# Patient Record
Sex: FEMALE | Race: WHITE
[De-identification: names, ages, dates, MRNs, and addresses within clinical notes are randomized per-mention and may not be internally consistent; named-entity substitution may affect disease eponyms.]

---

## 2017-05-12 ENCOUNTER — HOSPITAL ENCOUNTER (OUTPATIENT)
Dept: HOSPITAL 63 - MAMMO | Age: 72
Discharge: HOME | End: 2017-05-12
Attending: PHYSICIAN ASSISTANT
Payer: MEDICARE

## 2017-05-12 DIAGNOSIS — Z12.31: Primary | ICD-10-CM

## 2017-05-12 PROCEDURE — 77063 BREAST TOMOSYNTHESIS BI: CPT

## 2017-05-12 NOTE — RAD
DATE: 5/12/2017



EXAM: MAMMO YUNIEL SCREENING BILATERAL



HISTORY: Screening. Note is made of the positive family history for breast

malignancy.



COMPARISON: 4/8/2016



This study was interpreted with the benefit of Computerized Aided Detection

(CAD).



FINDINGS:



Breast Density: SCATTERED  The breast parenchyma shows scattered

fibroglandular densities. Breast parenchyma level B. 



 There has not been a significant change in the appearance of the breasts

compared to the previous exam  







IMPRESSION: Benign finding







BI-RADS CATEGORY: 2 BENIGN FINDING(S)



RECOMMENDED FOLLOW-UP: 12M 12 MONTH FOLLOW-UP



PQRS compliance statement: Patient information was entered into a reminder

system with a target due date 5/12/2018 for the next mammogram.



Mammography is a sensitive method for finding small breast cancers, but it

does not detect them all and is not a substitute for careful clinical

examination.  A negative mammogram does not negate a clinically suspicious

finding and should not result in delay in biopsying a clinically suspicious

abnormality.



"Our facility is accredited by the [employer] Mammography

Program."

## 2017-11-01 ENCOUNTER — HOSPITAL ENCOUNTER (OUTPATIENT)
Dept: HOSPITAL 63 - MAMMO | Age: 72
Discharge: HOME | End: 2017-11-01
Attending: PHYSICIAN ASSISTANT
Payer: MEDICARE

## 2017-11-01 DIAGNOSIS — N64.52: ICD-10-CM

## 2017-11-01 DIAGNOSIS — R92.1: ICD-10-CM

## 2017-11-01 DIAGNOSIS — R92.0: ICD-10-CM

## 2017-11-01 DIAGNOSIS — N63.20: Primary | ICD-10-CM

## 2017-11-01 NOTE — RAD
DATE: 11/1/2017   



EXAM: DIGITAL DIAGNOSTIC LT



HISTORY: Left nipple discharge   



COMPARISON: 5/12/2017, 4/8/2016   



This study was interpreted with the benefit of Computerized Aided Detection

(CAD).



The breast parenchyma shows scattered fibroglandular densities. Breast

parenchyma level B.





FINDINGS: 2-D and 3-D tomosynthesis imaging of the left breast was obtained in

the CC and MLO projections. There are scattered fibroglandular densities in

the breast in a nodular pattern. There are several discrete smooth nodules in

the left breast which appear unchanged. No new or enlarging breast densities

seen. There are multiple benign secretory type calcifications in the left

breast. There are several additional scattered clusters of microcalcifications

which are also unchanged.





IMPRESSION: Stable left breast nodules and microcalcifications as described

above. If a reproducible breast discharge is present, galactography may be

considered for further evaluation.





BI-RADS CATEGORY: 0 INCOMPLETE: NEEDS ADDITIONAL IMAGING EVALUATION AND/OR

PRIOR MAMMOGRAMS FOR COMPARISON.



RECOMMENDED FOLLOW-UP: ADD ADDITIONAL IMAGING



PQRS compliance statement: Patient information was entered into a reminder

system with a target due date     for the next mammogram.



Mammography is a sensitive method for finding small breast cancers, but it

does not detect them all and is not a substitute for careful clinical

examination.  A negative mammogram does not negate a clinically suspicious

finding and should not result in delay in biopsying a clinically suspicious

abnormality.



"Our facility is accredited by the [employer] Mammography

Program."

## 2017-12-12 ENCOUNTER — HOSPITAL ENCOUNTER (OUTPATIENT)
Dept: HOSPITAL 61 - MAMMO | Age: 72
Discharge: HOME | End: 2017-12-12
Payer: MEDICARE

## 2017-12-12 DIAGNOSIS — N64.52: ICD-10-CM

## 2017-12-12 DIAGNOSIS — N60.42: Primary | ICD-10-CM

## 2017-12-12 DIAGNOSIS — R92.1: ICD-10-CM

## 2017-12-12 PROCEDURE — 77053 X-RAY OF MAMMARY DUCT: CPT

## 2017-12-12 PROCEDURE — 19030 NJX PX ONLY MAM DUCTO/GLCTO: CPT

## 2017-12-12 NOTE — RAD
Left breast ductogram, 12/12/2017:



History: Left breast discharge



Following cleansing of the left nipple, a single discharging duct was

identified near the center of the nipple. It was successfully cannulated with

a 27-gauge blunt-tipped cannula. Iodinated contrast material was injected and

appropriate digital mammograms obtained.



The preliminary CC image again demonstrates numerous secretory type

calcifications in the left breast. There are additional clustered

microcalcifications which have been shown to be stable.



The ductal system centered at the 3-4 o'clock location was partially

opacified. Some of the injected contrast preferentially spilled back out of

the duct onto the surface of the patient. There is considerable ductal

dilatation just deep to the nipple. The ductal branches demonstrate multiple

stenotic segments intermixed with dilated segments. On several of the cc and

ML images there is a suggestion of a small filling defect within an

incompletely opacified, distended duct at the 3:00 location. No other filling

defect is seen in the visualized ducts.





IMPRESSION:

1. Ductal ectasia.

2. Incomplete opacification of the ducts due to technical factors.

3. Possible small filling defect in an incompletely opacified duct at the 3:00

location. Repeat ductography followed by a targeted ultrasound exam may be

useful for further evaluation. Alternatively, surgical exploration of the duct

following methylene blue dye instillation into the duct could be considered.

Surgical consultation is suggested.

## 2018-01-22 ENCOUNTER — HOSPITAL ENCOUNTER (OUTPATIENT)
Dept: HOSPITAL 61 - US | Age: 73
Discharge: HOME | End: 2018-01-22
Attending: SURGERY
Payer: MEDICARE

## 2018-01-22 DIAGNOSIS — N64.52: ICD-10-CM

## 2018-01-22 DIAGNOSIS — N63.20: Primary | ICD-10-CM

## 2018-01-22 PROCEDURE — 76641 ULTRASOUND BREAST COMPLETE: CPT

## 2018-02-09 ENCOUNTER — HOSPITAL ENCOUNTER (OUTPATIENT)
Dept: HOSPITAL 61 - SURG | Age: 73
Discharge: HOME | End: 2018-02-09
Attending: SURGERY
Payer: MEDICARE

## 2018-02-09 VITALS
SYSTOLIC BLOOD PRESSURE: 139 MMHG | DIASTOLIC BLOOD PRESSURE: 82 MMHG | SYSTOLIC BLOOD PRESSURE: 139 MMHG | DIASTOLIC BLOOD PRESSURE: 82 MMHG

## 2018-02-09 DIAGNOSIS — Z86.39: ICD-10-CM

## 2018-02-09 DIAGNOSIS — I10: ICD-10-CM

## 2018-02-09 DIAGNOSIS — D64.9: ICD-10-CM

## 2018-02-09 DIAGNOSIS — Z88.2: ICD-10-CM

## 2018-02-09 DIAGNOSIS — E11.9: ICD-10-CM

## 2018-02-09 DIAGNOSIS — Z87.39: ICD-10-CM

## 2018-02-09 DIAGNOSIS — Z98.42: ICD-10-CM

## 2018-02-09 DIAGNOSIS — N64.52: Primary | ICD-10-CM

## 2018-02-09 DIAGNOSIS — Z90.49: ICD-10-CM

## 2018-02-09 DIAGNOSIS — Z72.89: ICD-10-CM

## 2018-02-09 DIAGNOSIS — Z98.41: ICD-10-CM

## 2018-02-09 DIAGNOSIS — Z79.899: ICD-10-CM

## 2018-02-09 DIAGNOSIS — Z88.1: ICD-10-CM

## 2018-02-09 DIAGNOSIS — N63.0: ICD-10-CM

## 2018-02-09 LAB — POC GLUCOSE: 136 MG/DL (ref 70–99)

## 2018-02-09 PROCEDURE — 19083 BX BREAST 1ST LESION US IMAG: CPT

## 2018-02-09 PROCEDURE — 88305 TISSUE EXAM BY PATHOLOGIST: CPT

## 2018-02-09 PROCEDURE — 76942 ECHO GUIDE FOR BIOPSY: CPT

## 2018-02-09 PROCEDURE — 82962 GLUCOSE BLOOD TEST: CPT

## 2018-02-09 PROCEDURE — 76098 X-RAY EXAM SURGICAL SPECIMEN: CPT

## 2018-02-09 RX ADMIN — SODIUM CHLORIDE, SODIUM LACTATE, POTASSIUM CHLORIDE, AND CALCIUM CHLORIDE 1 MLS/HR: .6; .31; .03; .02 INJECTION, SOLUTION INTRAVENOUS at 07:00

## 2018-02-09 RX ADMIN — HYDROCODONE BITARTRATE AND ACETAMINOPHEN 1 TAB: 5; 325 TABLET ORAL at 12:41

## 2018-02-09 RX ADMIN — BUPIVACAINE HYDROCHLORIDE AND EPINEPHRINE BITARTRATE 1 ML: 5; .005 INJECTION, SOLUTION EPIDURAL; INTRACAUDAL; PERINEURAL at 11:08

## 2018-02-09 RX ADMIN — METHYLENE BLUE 1 ML: 10 INJECTION INTRAVENOUS at 11:08

## 2018-02-09 RX ADMIN — BACITRACIN 1 MLS/HR: 5000 INJECTION, POWDER, FOR SOLUTION INTRAMUSCULAR at 11:00

## 2018-07-18 ENCOUNTER — HOSPITAL ENCOUNTER (OUTPATIENT)
Dept: HOSPITAL 63 - MAMMO | Age: 73
Discharge: HOME | End: 2018-07-18
Attending: PHYSICIAN ASSISTANT
Payer: MEDICARE

## 2018-07-18 DIAGNOSIS — Z12.31: Primary | ICD-10-CM

## 2018-07-18 PROCEDURE — 77063 BREAST TOMOSYNTHESIS BI: CPT

## 2018-07-18 PROCEDURE — 77067 SCR MAMMO BI INCL CAD: CPT

## 2018-07-18 NOTE — RAD
DATE: July 18, 2018



EXAM: MAMMO YUNIEL SCREENING BILATERAL



HISTORY: Routine screening.



COMPARISON: Priors back to April 8, 2016.



TECHNIQUE: 2D digital CC and MLO views were obtained.  3D tomosynthesis

imaging was performed in the CC and MLO projections.



This study was interpreted with the benefit of Computerized Aided Detection

(CAD).



FINDINGS:



The breast parenchyma demonstrates scattered fibroglandular densities,

category B.  There is no worrisome mass or area of architectural distortion. 

Secretory and other benign-appearing calcifications are noted bilaterally. 

These are stable.  There are postoperative findings of lumpectomy on the left,

at the 3:00 position extending retroareolar.





IMPRESSION: Benign findings.





BI-RADS CATEGORY: 2 BENIGN FINDING



RECOMMENDED FOLLOW-UP: 12M 12 MONTH FOLLOW-UP



PQRS compliance statement: Patient information was entered into a reminder

system with a target due date for the next mammogram.



Mammography is a sensitive method for finding small breast cancers, but it

does not detect them all and is not a substitute for careful clinical

examination.  A negative mammogram does not negate a clinically suspicious

finding and should not result in delay in biopsying a clinically suspicious

abnormality.



"Our facility is accredited by the [employer] Mammography

Program."

## 2019-08-30 ENCOUNTER — HOSPITAL ENCOUNTER (OUTPATIENT)
Dept: HOSPITAL 61 - NM | Age: 74
Discharge: HOME | End: 2019-08-30
Attending: INTERNAL MEDICINE
Payer: MEDICARE

## 2019-08-30 DIAGNOSIS — E11.9: ICD-10-CM

## 2019-08-30 DIAGNOSIS — Z87.891: ICD-10-CM

## 2019-08-30 DIAGNOSIS — I45.10: Primary | ICD-10-CM

## 2019-08-30 PROCEDURE — 78452 HT MUSCLE IMAGE SPECT MULT: CPT

## 2019-08-30 PROCEDURE — A9500 TC99M SESTAMIBI: HCPCS

## 2019-08-30 PROCEDURE — 93017 CV STRESS TEST TRACING ONLY: CPT

## 2019-08-30 NOTE — RAD
MR#: [id number]

Account#: [id number]

Accession#: [id number]

Date of Study: 08/30/2019

Ordering Physician: YVONNE BYRNE, 

Referring Physician: NIURKA ZAZUETA Tech: NEYMAR Bowman





--------------- APPROVED REPORT --------------





Test Type:          Exercise

Stress Nurse/Tech: MINGO Coppola

Test Indications: Chest pain

Cardiac History: Kidney diseae, x-smoker, DM

Medications:     See Electronic Medical Record

Medical History: See Electronic Medical Record

Resting ECG:     SR with BBB

Resting Heart Rate: 88 bpm

Resting Blood Pressure: 141/77mmHg

Pretest Chest Pain: None



Nurse/Tech Notes

Lungs CTA, S1S2

Consent: The procedure was explained to the patient in lay terms. Informed consent was witnessed. Sergey
eout was entered into [employer]Picker. History and Stress Test performed by mingo Coppola RN



Stress Symptoms

Dyspnea, no chest pain



POST EXERCISE

Reason for Termination: Reached target heart rate

Target HR: 131

Max HR: 141 bpm

95% of Maximum Predicted HR: 149 bpm

Exercise duration: 4:14 min:sec, 2 Stage

Max Blood Pressure: 174/77mmHg

Blood Pressure response to exercise: Normal blood pressure response during stress.

Heart Rate response to exercise: normal response

Chest Pain: No. 

Arrhythmia: No. 

ST Change: No. 



INTERPRETATION

Stress EKG Conclusion: The resting EKG shows a sinus rhythm, nonspecific ST-T wave changes and an inc
omplete right bundle branch block.

The stress EKG shows no significant changes from baseline.

No EKG evidence of stress-induced ischemia.



Imaging Protocol

IMAGE PROTOCOL: Rest Tc-99m/stress Tc-99m 1 day



Rest:            Stress:         Viability:   

Radiopharm.Tc99m BddvkfaivKi88h Sestamibi

Zqio68fVn            33mCi            

Duration    15min.           10min.           

Img Date  08/30/2019 08/30/2019      

Inj-Img Nmev26rxd.           60min.           



Post-Injection Exercise:   1 minute

Rest Admin Site:IV - Left AntecubitalAdministrator:NEYMAR Bowman

Stress Admin Site: IV - Left AntecubitalAdministrator: LEO Gomez, ARRT (R)(N)



STRESS DATA

End Diast. Vol.58.0mlAv. Heart Ebsr488.0bpm

End Syst. Vol.12.0mlCO Index BSA0.0L/min

Myocardial Qfhb859.0gEject. Lhfpyxld58.0%



Stress Rates

Pk. Fill Rate4.93EDV/secLVtime Pk. Fill 137.76msec

Pk. Empty Rate5.93ESV/secLVtime Pk. Uqqqk937.92msec

1/3 Pk. Fill0.73EDV/sec



Stress Scores

Regional WT2.00Summed WT7.00

Regional WM0.00Summed WM0.00



LV Perfusion

The stress scans show no significant defects.

The rest scans show no significant defects.

Nuclear imaging shows no reversible ischemia or infarct.



Wall Motion

Ventricular systolic function is normal with no regional wall motion abnormalities and an ejection fr
action of greater than 70%.



LV Perf. Quant

17 Seg. SSS1.00

17 Seg. SRS2.00

17 Seg. SDS0.00

Stress Defect Extent (% LAD)0.00Rest Defect Extent (% LAD)0.00Rev. Defect Extent (% LAD)0.00

Stress Defect Extent (% LCX) 0.00Rest Defect Extent (% LCX)0.00Rev. Defect Extent (% LCX)0.00

Stress Defect Extent (% RCA)0.00Rest Defect Extent (% RCA)12.20Rev. Defect Extent (% RCA)0.00

Stress Defect Extent (% WILLOW)0.00Rest Defect Extent (% WILLOW)2.40Rev. Defect Extent (% WILLOW)0.00



Conclusion

1. Fair exercise tolerance.

2. No chest pain reported with exertion.

3. No EKG evidence of stressed induced ischemia.

4. Nuclear imaging shows no reversible ischemia or infarct.

5. Left ventricular systolic function is normal with an ejection fraction of greater than 70%.

6. Low risk Lexiscan nuclear stress test.



Signed by : Michael Spring MD

Electronically Approved : 08/30/2019 11:49:34

## 2019-10-01 ENCOUNTER — HOSPITAL ENCOUNTER (OUTPATIENT)
Dept: HOSPITAL 63 - ECHO | Age: 74
Discharge: HOME | End: 2019-10-01
Payer: MEDICARE

## 2019-10-01 DIAGNOSIS — R07.9: Primary | ICD-10-CM

## 2019-10-01 PROCEDURE — 93306 TTE W/DOPPLER COMPLETE: CPT

## 2019-10-01 NOTE — CARD
MR#: [id number]

Account#: [id number]

Accession#: [id number]

Date of Study: 10/01/2019

Ordering Physician: YVONNE BYRNE, 

Referring Physician: YVONNE BYRNE, 

Tech: Isi Ledezma TED





--------------- APPROVED REPORT --------------





EXAM: Two-dimensional and M-mode echocardiogram with Doppler and color Doppler.



Other Information 

Quality : AverageHR: 90bpm

Rhythm : NSR



INDICATION

Chest Pain 



2D DIMENSIONS 

RVDd3.0 (2.9-3.5cm)Left Atrium(2D)3.2 (1.6-4.0cm)

IVSd1.1 (0.7-1.1cm)Aortic Root(2D)2.8 (2.0-3.7cm)

LVDd4.8 (3.9-5.9cm)LVOT Diameter1.9 (1.8-2.4cm)

PWd0.9 (0.7-1.1cm)LVDs3.4 (2.5-4.0cm)

FS (%) 27.7 %SV56.5 ml

LVEF(%)53.7 (>50%)



M-Mode DIMENSIONS 

Left Atrium(MM)3.37 (2.5-4.0cm)Aortic Root3.23 (2.2-3.7cm)



Aortic Valve

AoV Peak Gab.143.2cm/Cipriano Peak GR.8.2mmHg

LVOT Peak Gab.103.4cm/sLVOT  VTI 25.24cm

SORAYA (VMAX)2.58ka8EQX   (VTI)2.20cm2



Mitral Valve

MV E Qfluveyo46.0cm/sMV DECEL NTFV503of

MV A Sxdokcul826.0cm/sE/A  Ratio0.8



Pulmonary Valve

PV Peak Lonvybuf474.6cm/sPV Peak Grad.5mmHg



Tricuspid Valve

TR P. Zoyiycyz619qu/sRAP VWWQWQRG5bkVn

TR Peak Gr.40iwDfJTFH86bsOr



 LEFT VENTRICLE 

The left ventricle is normal size. There is normal left ventricular wall thickness. The left ventricu
lar systolic function is normal. The Ejection Fraction is 55-60%. There is normal LV segmental wall m
otion. Transmitral Doppler flow pattern is Grade I-abnormal relaxation pattern.



 RIGHT VENTRICLE 

The right ventricle is normal size. There is normal right ventricular wall thickness. The right ventr
icular systolic function is normal.



 ATRIA 

The left atrium size is normal. The right atrium size is normal. The interatrial septum is intact wit
h no evidence for an atrial septal defect or patent foramen ovale as noted on 2-D or Doppler imaging.




 AORTIC VALVE 

The aortic valve is normal in structure and function. The aortic valve is trileaflet. Doppler and Col
or Flow revealed trace aortic regurgitation. There is no significant aortic valvular stenosis. There 
is no aortic valvular vegetation.



 MITRAL VALVE 

The mitral valve is normal in structure and function. There is no evidence of mitral valve prolapse. 
There is no mitral valve stenosis. Doppler and Color-flow revealed trace mitral regurgitation.



 TRICUSPID VALVE 

The tricuspid valve is normal in structure and function. Doppler and Color Flow revealed trace tricus
pid regurgitation. The PA pressure was estimated at 28 mmHg. There is no tricuspid valve prolapse or 
vegetation. There is no tricuspid valve stenosis.



 PULMONIC VALVE 

The pulmonic valve is not well visualized.



 GREAT VESSELS 

The aortic root is normal in size. The ascending aorta is normal in size. The IVC is normal in size a
nd collapses >50% with inspiration.



 PERICARDIAL EFFUSION 

There is no evidence of significant pericardial effusion.



Critical Notification

Critical Value: No



<Conclusion>

The left ventricular systolic function is normal.

The Ejection Fraction is 55-60%.

There is normal LV segmental wall motion.

Transmitral Doppler flow pattern is Grade I-abnormal relaxation pattern.

Trace mitral regurgitation.

Trace tricuspid regurgitation.

The PA pressure was estimated at 28 mmHg.

There is no evidence of significant pericardial effusion.



Signed by : Mk Wray, 

Electronically Approved : 10/01/2019 09:23:13

## 2019-10-02 ENCOUNTER — HOSPITAL ENCOUNTER (OUTPATIENT)
Dept: HOSPITAL 63 - MAMMO | Age: 74
Discharge: HOME | End: 2019-10-02
Attending: PHYSICIAN ASSISTANT
Payer: MEDICARE

## 2019-10-02 DIAGNOSIS — N63.11: Primary | ICD-10-CM

## 2019-10-02 PROCEDURE — 77067 SCR MAMMO BI INCL CAD: CPT

## 2019-10-02 PROCEDURE — 77063 BREAST TOMOSYNTHESIS BI: CPT

## 2019-10-04 NOTE — RAD
DATE: 10 2/2/2019



EXAM: MAMMO YUNIEL SCREENING BILATERAL



HISTORY: Routine screening. Benign left breast biopsy in 2018.



COMPARISON: 7/18/2018, 5/12/2017, 4/8/2016 mammographic exams



This study was interpreted with the benefit of Computerized Aided Detection

(CAD).





Breast Density: HETERO The breast parenchyma is heterogenously dense, which

could reduce sensitivity of mammography. Breast parenchyma level C.





FINDINGS: Distortion involving the left breast is stable correspond to

previous biopsy. No suspicious calcifications. No suspicious distortion. Small

mass at the right upper outer breast posteriorly approximately 9 cm from the

nipple is present. This may represent a lymph node although it is not

definitely seen on prior exams. It measures 0.8 cm diameter. Small asymmetry

is present lateral to this on the CC view. This asymmetry may present on prior

exams.



IMPRESSION: Right posterior upper outer breast mass which likely represents a

lymph node. Asymmetry lateral to this finding on the CC projection noted.







BI-RADS CATEGORY: 0 INCOMPLETE: NEEDS ADDITIONAL IMAGING EVALUATION AND/OR

PRIOR MAMMOGRAMS FOR COMPARISON.



 FOLLOW-UP: ADD ADDITIONAL IMAGING. Spot compression imaging of the right

upper-outer breast mass in the CC and MLO projections is recommended.

Ultrasound of the right upper-outer breast may be needed.



PQRS compliance statement: Patient information was entered into a reminder

system with a target due date pending further imaging for the next mammogram.



Mammography is a sensitive method for finding small breast cancers, but it

does not detect them all and is not a substitute for careful clinical

examination.  A negative mammogram does not negate a clinically suspicious

finding and should not result in delay in biopsying a clinically suspicious

abnormality.



"Our facility is accredited by the [employer] Mammography

Program."

## 2019-10-22 ENCOUNTER — HOSPITAL ENCOUNTER (OUTPATIENT)
Dept: HOSPITAL 63 - MAMMO | Age: 74
Discharge: HOME | End: 2019-10-22
Attending: PHYSICIAN ASSISTANT
Payer: MEDICARE

## 2019-10-22 DIAGNOSIS — R92.2: Primary | ICD-10-CM

## 2019-10-22 PROCEDURE — 76641 ULTRASOUND BREAST COMPLETE: CPT

## 2019-10-22 PROCEDURE — 77065 DX MAMMO INCL CAD UNI: CPT

## 2019-10-22 NOTE — RAD
DATE: 10/22/2019



EXAM: DIGITAL DIAGNOSTIC RT, BREAST RIGHT



HISTORY: Abnormal mammogram



COMPARISON: 4/8/2016, 5/12/2017, 7/18/2018 mammographic exams



This study was interpreted with the benefit of Computerized Aided Detection

(CAD).





Breast Density: SCATTERED The breast parenchyma shows scattered fibroglandular

densities. Breast parenchyma level B.





FINDINGS: Persistence of the asymmetry at the outer aspect of the right breast

is noted on spot compression imaging in the CC projection. A small lucency is

noted within this structure which may represent a hilum of a lymph node. This

asymmetry may have a finding on spot compression imaging in the upper right

breast on the MLO view. These findings are smoothly marginated.



A smaller asymmetry is identified persist but appears to be present on

7/18/2018.



Limited ultrasound examination of the right upper-outer breast does not

demonstrate a mass or nodule.





IMPRESSION: Right upper outer breast asymmetry which may represent a lymph

node.







BI-RADS CATEGORY: 3 PROBABLY BENIGN FINDING(S)-SHORT INTERVAL FOLLOW-UP

SUGGESTED



RECOMMENDED FOLLOW-UP: 6M 6 MONTH FOLLOW-UP. Six-month follow-up mammographic

examination of the right breast is recommended to assess stability. Ultrasound

may be needed at that time.



PQRS compliance statement: Patient information was entered into a reminder

system with a target due date for the next mammogram.



Mammography is a sensitive method for finding small breast cancers, but it

does not detect them all and is not a substitute for careful clinical

examination.  A negative mammogram does not negate a clinically suspicious

finding and should not result in delay in biopsying a clinically suspicious

abnormality.



"Our facility is accredited by the [employer] Mammography

Program."

## 2020-04-22 ENCOUNTER — HOSPITAL ENCOUNTER (INPATIENT)
Dept: HOSPITAL 63 - 1 SOUTH | Age: 75
LOS: 10 days | Discharge: NURSING, REHAB OR LONG-TERM CARE | DRG: 371 | End: 2020-05-02
Attending: INTERNAL MEDICINE | Admitting: INTERNAL MEDICINE
Payer: MEDICARE

## 2020-04-22 VITALS — DIASTOLIC BLOOD PRESSURE: 61 MMHG | SYSTOLIC BLOOD PRESSURE: 94 MMHG

## 2020-04-22 VITALS — BODY MASS INDEX: 32.99 KG/M2 | WEIGHT: 205.25 LBS | HEIGHT: 66 IN

## 2020-04-22 VITALS — SYSTOLIC BLOOD PRESSURE: 102 MMHG | DIASTOLIC BLOOD PRESSURE: 61 MMHG

## 2020-04-22 VITALS — DIASTOLIC BLOOD PRESSURE: 65 MMHG | SYSTOLIC BLOOD PRESSURE: 100 MMHG

## 2020-04-22 DIAGNOSIS — Z98.42: ICD-10-CM

## 2020-04-22 DIAGNOSIS — D50.0: ICD-10-CM

## 2020-04-22 DIAGNOSIS — E87.70: ICD-10-CM

## 2020-04-22 DIAGNOSIS — Z79.4: ICD-10-CM

## 2020-04-22 DIAGNOSIS — E78.5: ICD-10-CM

## 2020-04-22 DIAGNOSIS — I12.9: ICD-10-CM

## 2020-04-22 DIAGNOSIS — Z20.828: ICD-10-CM

## 2020-04-22 DIAGNOSIS — E87.1: ICD-10-CM

## 2020-04-22 DIAGNOSIS — E87.2: ICD-10-CM

## 2020-04-22 DIAGNOSIS — E43: ICD-10-CM

## 2020-04-22 DIAGNOSIS — Z87.891: ICD-10-CM

## 2020-04-22 DIAGNOSIS — Z98.41: ICD-10-CM

## 2020-04-22 DIAGNOSIS — A04.72: Primary | ICD-10-CM

## 2020-04-22 DIAGNOSIS — D64.9: ICD-10-CM

## 2020-04-22 DIAGNOSIS — E11.43: ICD-10-CM

## 2020-04-22 DIAGNOSIS — E86.0: ICD-10-CM

## 2020-04-22 DIAGNOSIS — Z80.1: ICD-10-CM

## 2020-04-22 DIAGNOSIS — N18.9: ICD-10-CM

## 2020-04-22 DIAGNOSIS — N17.9: ICD-10-CM

## 2020-04-22 DIAGNOSIS — Z82.49: ICD-10-CM

## 2020-04-22 DIAGNOSIS — Z80.3: ICD-10-CM

## 2020-04-22 DIAGNOSIS — Z90.710: ICD-10-CM

## 2020-04-22 DIAGNOSIS — E11.22: ICD-10-CM

## 2020-04-22 DIAGNOSIS — E66.9: ICD-10-CM

## 2020-04-22 LAB
% BANDS: 5 % (ref 0–9)
% LYMPHS: 5 % (ref 24–48)
% MONOS: 3 % (ref 0–10)
% SEGS: 87 % (ref 35–66)
ALBUMIN SERPL-MCNC: 2.3 G/DL (ref 3.4–5)
ALBUMIN/GLOB SERPL: 0.6 {RATIO} (ref 1–1.7)
ALP SERPL-CCNC: 106 U/L (ref 46–116)
ALT SERPL-CCNC: 23 U/L (ref 14–59)
ANION GAP SERPL CALC-SCNC: 13 MMOL/L (ref 6–14)
AST SERPL-CCNC: 47 U/L (ref 15–37)
BASOPHILS # BLD AUTO: 0.1 X10^3/UL (ref 0–0.2)
BASOPHILS NFR BLD: 0 % (ref 0–3)
BILIRUB SERPL-MCNC: 1 MG/DL (ref 0.2–1)
BUN/CREAT SERPL: 16 (ref 6–20)
CA-I SERPL ISE-MCNC: 60 MG/DL (ref 7–20)
CALCIUM SERPL-MCNC: 8.9 MG/DL (ref 8.5–10.1)
CHLORIDE SERPL-SCNC: 93 MMOL/L (ref 98–107)
CO2 SERPL-SCNC: 21 MMOL/L (ref 21–32)
CREAT SERPL-MCNC: 3.8 MG/DL (ref 0.6–1)
EOSINOPHIL NFR BLD: 0.2 X10^3/UL (ref 0–0.7)
EOSINOPHIL NFR BLD: 1 % (ref 0–3)
ERYTHROCYTE [DISTWIDTH] IN BLOOD BY AUTOMATED COUNT: 16.8 % (ref 11.5–14.5)
GFR SERPLBLD BASED ON 1.73 SQ M-ARVRAT: 11.7 ML/MIN
GLOBULIN SER-MCNC: 4 G/DL (ref 2.2–3.8)
GLUCOSE SERPL-MCNC: 205 MG/DL (ref 70–99)
HCT VFR BLD CALC: 32.7 % (ref 36–47)
HGB BLD-MCNC: 10.6 G/DL (ref 12–15.5)
LYMPHOCYTES # BLD: 1.4 X10^3/UL (ref 1–4.8)
LYMPHOCYTES NFR BLD AUTO: 4 % (ref 24–48)
MCH RBC QN AUTO: 31 PG (ref 25–35)
MCHC RBC AUTO-ENTMCNC: 33 G/DL (ref 31–37)
MCV RBC AUTO: 95 FL (ref 79–100)
MONO #: 0.9 X10^3/UL (ref 0–1.1)
MONOCYTES NFR BLD: 3 % (ref 0–9)
NEUT #: 31 X10^3UL (ref 1.8–7.7)
NEUTROPHILS NFR BLD AUTO: 93 % (ref 31–73)
PLATELET # BLD AUTO: 228 X10^3/UL (ref 140–400)
PLATELET # BLD EST: ADEQUATE 10*3/UL
POTASSIUM SERPL-SCNC: 4.7 MMOL/L (ref 3.5–5.1)
PROT SERPL-MCNC: 6.3 G/DL (ref 6.4–8.2)
RBC # BLD AUTO: 3.45 X10^6/UL (ref 3.5–5.4)
SODIUM SERPL-SCNC: 127 MMOL/L (ref 136–145)
WBC # BLD AUTO: 33.5 X10^3/UL (ref 4–11)

## 2020-04-22 PROCEDURE — 83735 ASSAY OF MAGNESIUM: CPT

## 2020-04-22 PROCEDURE — 82947 ASSAY GLUCOSE BLOOD QUANT: CPT

## 2020-04-22 PROCEDURE — 71045 X-RAY EXAM CHEST 1 VIEW: CPT

## 2020-04-22 PROCEDURE — 80048 BASIC METABOLIC PNL TOTAL CA: CPT

## 2020-04-22 PROCEDURE — 36415 COLL VENOUS BLD VENIPUNCTURE: CPT

## 2020-04-22 PROCEDURE — 83605 ASSAY OF LACTIC ACID: CPT

## 2020-04-22 PROCEDURE — 87040 BLOOD CULTURE FOR BACTERIA: CPT

## 2020-04-22 PROCEDURE — 71250 CT THORAX DX C-: CPT

## 2020-04-22 PROCEDURE — 85025 COMPLETE CBC W/AUTO DIFF WBC: CPT

## 2020-04-22 PROCEDURE — 85027 COMPLETE CBC AUTOMATED: CPT

## 2020-04-22 PROCEDURE — 81001 URINALYSIS AUTO W/SCOPE: CPT

## 2020-04-22 PROCEDURE — 80053 COMPREHEN METABOLIC PANEL: CPT

## 2020-04-22 PROCEDURE — 85007 BL SMEAR W/DIFF WBC COUNT: CPT

## 2020-04-22 PROCEDURE — 74176 CT ABD & PELVIS W/O CONTRAST: CPT

## 2020-04-22 RX ADMIN — SODIUM CHLORIDE SCH MLS/HR: 0.9 INJECTION, SOLUTION INTRAVENOUS at 15:27

## 2020-04-22 RX ADMIN — GABAPENTIN SCH MG: 100 CAPSULE ORAL at 20:51

## 2020-04-22 RX ADMIN — VANCOMYCIN HYDROCHLORIDE SCH MG: 500 INJECTION, POWDER, LYOPHILIZED, FOR SOLUTION INTRAVENOUS at 20:51

## 2020-04-22 RX ADMIN — INSULIN LISPRO SCH UNITS: 100 INJECTION, SOLUTION INTRAVENOUS; SUBCUTANEOUS at 16:56

## 2020-04-22 RX ADMIN — VANCOMYCIN HYDROCHLORIDE SCH MG: 500 INJECTION, POWDER, LYOPHILIZED, FOR SOLUTION INTRAVENOUS at 17:20

## 2020-04-22 NOTE — RAD
Examination: CT of the chest abdomen pelvis without contrast

 

HISTORY: History of cough, intractable diarrhea

 

COMPARISON: None available 

 

Technique: Axial CT images of the chest abdomen pelvis were performed 

without IV contrast. Coronal and sagittal images are performed

 

Exposure: One or more of the following individualized dose reduction 

techniques were utilized for this examination:  1. Automated exposure 

control  2. Adjustment of the mA and/or kV according to patient size  3. 

Use of iterative reconstruction technique

 

FINDINGS:

 

 

The central airways are patent. The heart size grossly appears 

unremarkable. No radiologically significant mediastinal lymphadenopathy 

identified. Scattered subcentimeter lymph nodes identified in the 

bilateral lungs the largest measuring 5 mm in the left lower lobe of the 

lung. No evidence of pleural effusion or pneumothorax.

 

No evidence of free air identified in the abdomen.

 

The evaluation of the solid organs is limited due to lack of IV contrast. 

The evaluation of bowel is limited due to lack of oral contrast.. The 

visualized liver, spleen, adrenals grossly appears unremarkable. Small 

perihepatic, perisplenic fluid is identified. The visualized pancreas 

grossly appears unremarkable. The small bowel is nondilated.

 

There is moderate diffuse thickening of the wall of the colon throughout 

with surrounding infiltrative fat stranding likely diffuse colitis. The 

appendix is not well-visualized. Small amount of free fluid identified in 

the pelvis.

 

Urinary bladder is mildly distended.

 

No evidence of intrarenal collecting system calculi or hydronephrosis. 

There is a small hypodensity identified in the right kidney measuring 9 mm

probably hyperdense cyst. Moderate degenerative changes thoracic and 

lumbar spine. Mild sclerosis identified in the right and left femoral 

heads could be avascular necrosis.

 

 

Impression:

 

 

1. Diffuse thickening of the wall of the colon with surrounding 

inflammatory fat stranding likely diffuse colitis/infectious or 

inflammatory or pseudomembranous colitis. Correlate with lab values.

 

2. Mild ascites.

 

3. Few scattered bilateral subcentimeter nodules identified in the lungs 

with the largest measuring 5 mm in the left lobe of the lung. Follow-up in

6 months per Fleischner Society guidelines.

 

4. Small hyperdense cyst left kidney.

 

 

 

Electronically signed by: Bao Ureña MD (4/22/2020 3:44 PM) [id number]

## 2020-04-22 NOTE — NUR
NURSING NOTE ADMIT



PT DIRECT ADMIT FROM DR DELVALLE OFFICE WITH DX OF DEHYDRATION, DIARRHEA X 10 DAYS, INCREASE 
WBC COUNT. PT REPORTS WEIGHT LOSS OF ABOUT 10 POUNDS. PT STATES SHE HAS HAD DIARRHEA FOR TWO 
WEEKS, WAS SEEN TWICE, GIVEN FLUIDS, NO RELIEF. PT STATES SHE IS DIZZY AND LIGHT HEADED. PT 
STATES SHE HAS A DRY COUGH THAT STARTED 2 OR 3 DAYS AGO. PT VOICED SHORTNESS OF AIR WHEN 
MOVING FROM WHEELCHAIR TO BED BUT DENIED TO PHYSICIAN. PT IS ON ROOM AIR. PT SETTLED IN BED. 
DR MOLINA AT BEDSIDE. 



LESLIE WATTS.

## 2020-04-22 NOTE — HP
ADMIT DATE:  2020



HISTORY OF PRESENT ILLNESS:  The patient is a 74-year-old  female

patient who was admitted directly from her primary care physician's office and

she has had diarrhea that has been going on for almost 2 weeks.  It is not

associated with any nausea or vomiting.  There is no blood in the stool.  Denied

any abdominal pain.  Denied any chills, rigors or fever.  Did complain of

dizziness and lightheadedness.



The patient stated that all this started about more than 2 weeks ago when she

was diagnosed with severe sinusitis and was given treatment with antibiotic that

she cannot remember.  She completed a 10-day course of it and after which her

symptoms continued; therefore, she was started on ciprofloxacin 500 mg twice a

day.  She has started having diarrhea about 2 weeks ago and has not really

subsided.  Attempts have been made to treat her as an outpatient, but the

patient continued to have severe diarrhea, which is watery in nature and

therefore, she was admitted directly for rehydration and stool was sent for C.

diff toxin, the result of which is still pending at the time of this dictation.



PAST MEDICAL HISTORY:  Significant for insulin requiring type 2 diabetes

mellitus and hypertension.  He has diabetic neuropathy and what seems to be also

diabetic autonomic neuropathy.  She has also chronic kidney disease.  She is

also known to have hyperlipidemia, obesity, and has had prior history of melena

and anemia.



PAST SURGICAL HISTORY:  Significant for bilateral cataract extraction,

cholecystectomy, total abdominal hysterectomy, bilateral salpingo-oophorectomy. 

She has also had appendectomy and had a left breast mass with normal biopsy. 



FAMILY HISTORY:  She has 2 brothers, 1 older and has some form of spinal cord

embolism and the other one is younger and has had a history of myocardial

infarction.  Her father  at age of 72 because of lung cancer.  Mother 

at age of 58 because of breast cancer.



SOCIAL HISTORY:  She is  and lives with her .  She has one

daughter.  She smoked for 2 years now when she was 19 and 20 years old.  She

does not drink alcohol or use any recreational drugs.  She worked for the

[employer] at [employer].  She is currently retired.



REVIEW OF SYSTEMS:  The patient denied any blurring of vision, has had bilateral

cataract extraction, but denied any glaucoma or macular degeneration.  Denied

any earache, tinnitus or sensorineural deafness.  Denied any nosebleeds, stuffy

nose or postnasal drip.  Denied any sore throat, sore tongue, toothache,

hoarseness of voice or difficulty swallowing.  Denied any nausea, vomiting, but

has persistent diarrhea that has been going on.  Denied any melena or

hematochezia.  Denied any dysuria, frequency or hematuria.  Denied any chest

pain, shortness of breath, orthopnea, paroxysmal nocturnal dyspnea.  Did

complain of cough that started only 2 days ago, which was mostly dry.  Denied

any chills, rigors, or fever.  She did complain of dizziness and

lightheadedness, but denied any vertigo.



PHYSICAL EXAMINATION:

GENERAL:  When I saw her this afternoon, she was resting slightly propped up in

bed.  She was pale, but no jaundice, cyanosis or thyromegaly.  No jugular venous

distention.  No lower limb.

VITAL SIGNS:  Her heart rate was 100, blood pressure 94/61, respiratory rate 22,

temperature 97.6 and her oxygen saturation was 100% on room air.

HEAD, EYES, EARS, NOSE AND THROAT:  Showed normocephalic, atraumatic.

NECK:  Supple.

HEART:  Showed normal first and second heart sounds with no gallop, rub or

murmur.

CHEST:  Clear to auscultation.  No crepitation or rhonchi.

ABDOMEN:  Distended, soft, nontender.

NEUROLOGIC:  She is awake, alert, responding appropriately.  All cranial nerves

intact.

EXTREMITIES:  She moves extremities without difficulty.  She ambulates without

assistance or assistive devices.



LABORATORY DATA:  Her lab work done yesterday showed her white cell count was

28,000, her hemoglobin was 11, hematocrit 32, MCV was 91, and a platelet count

of 189,000 with a manual differential showed 88% polymorphs, 4% lymphocytes, and

6% monocytes.  Her chemistry showed that her serum sodium was 134, her potassium

was 4.9, chloride 99, bicarbonate was 18.  Calcium was 9.1.  Total bilirubin

1.3.  AST, ALT, alkaline phosphatase were all normal.  Her total protein was

6.2, albumin was 3.5.



PLAN:  My plan is to start her on IV fluid.  We will send stat labs including

CBC, CMP and lactic acid.  Also we are going to have a CT scan of the chest,

abdomen and pelvis without contrast and if there are any changes in her lungs

consistent of ground glass appearance, we COVID-19 by PCR.  We will keep her on

isolation for now and await the result also for the C. diff and if there is any

evidence of wall thickening of the colon, I will start her empirically on

vancomycin until we get the result of the C. diff toxins.  We will start her

also on IV fluid.





______________________________

ALICIA MOLINA MD



DR:  CONSUELO/kathy  JOB#:  [id number] / [id number]

DD:  2020 14:53  DT:  2020 15:32

## 2020-04-22 NOTE — NUR
NURSING NOTE SEPSIS SCREEN 



BLOOD CULTURES ORDERED. NO IV ANTIBIOTICS NEED AT THIS TIME PER DR MOLINA. WILL ORDER PO 
VANCO. PT HAD RECEIVED 1 LITER BOLUS UPON ARRIVAL. CURRENTLY ON NS /HR.  WILL CONTINUE 
TO MONITOR.



LESLIE WATTS.

## 2020-04-23 VITALS — DIASTOLIC BLOOD PRESSURE: 63 MMHG | SYSTOLIC BLOOD PRESSURE: 98 MMHG

## 2020-04-23 VITALS — DIASTOLIC BLOOD PRESSURE: 60 MMHG | SYSTOLIC BLOOD PRESSURE: 95 MMHG

## 2020-04-23 VITALS — SYSTOLIC BLOOD PRESSURE: 90 MMHG | DIASTOLIC BLOOD PRESSURE: 56 MMHG

## 2020-04-23 VITALS — DIASTOLIC BLOOD PRESSURE: 65 MMHG | SYSTOLIC BLOOD PRESSURE: 99 MMHG

## 2020-04-23 VITALS — DIASTOLIC BLOOD PRESSURE: 56 MMHG | SYSTOLIC BLOOD PRESSURE: 89 MMHG

## 2020-04-23 VITALS — DIASTOLIC BLOOD PRESSURE: 66 MMHG | SYSTOLIC BLOOD PRESSURE: 102 MMHG

## 2020-04-23 LAB
ALBUMIN SERPL-MCNC: 1.8 G/DL (ref 3.4–5)
ALBUMIN/GLOB SERPL: 0.6 {RATIO} (ref 1–1.7)
ALP SERPL-CCNC: 75 U/L (ref 46–116)
ALT SERPL-CCNC: 18 U/L (ref 14–59)
ANION GAP SERPL CALC-SCNC: 11 MMOL/L (ref 6–14)
APTT PPP: YELLOW S
AST SERPL-CCNC: 22 U/L (ref 15–37)
BACTERIA #/AREA URNS HPF: (no result) /HPF
BILIRUB SERPL-MCNC: 0.5 MG/DL (ref 0.2–1)
BILIRUB UR QL STRIP: (no result)
BUN/CREAT SERPL: 17 (ref 6–20)
CA-I SERPL ISE-MCNC: 57 MG/DL (ref 7–20)
CALCIUM SERPL-MCNC: 8 MG/DL (ref 8.5–10.1)
CHLORIDE SERPL-SCNC: 100 MMOL/L (ref 98–107)
CO2 SERPL-SCNC: 21 MMOL/L (ref 21–32)
CREAT SERPL-MCNC: 3.3 MG/DL (ref 0.6–1)
ERYTHROCYTE [DISTWIDTH] IN BLOOD BY AUTOMATED COUNT: 16.2 % (ref 11.5–14.5)
FIBRINOGEN PPP-MCNC: (no result) MG/DL
GFR SERPLBLD BASED ON 1.73 SQ M-ARVRAT: 13.7 ML/MIN
GLOBULIN SER-MCNC: 3.2 G/DL (ref 2.2–3.8)
GLUCOSE SERPL-MCNC: 48 MG/DL (ref 70–99)
GLUCOSE UR STRIP-MCNC: (no result) MG/DL
HCT VFR BLD CALC: 27.2 % (ref 36–47)
HGB BLD-MCNC: 8.9 G/DL (ref 12–15.5)
HYALINE CASTS #/AREA URNS LPF: (no result) /HPF
MCH RBC QN AUTO: 31 PG (ref 25–35)
MCHC RBC AUTO-ENTMCNC: 33 G/DL (ref 31–37)
MCV RBC AUTO: 94 FL (ref 79–100)
NITRITE UR QL STRIP: (no result)
PLATELET # BLD AUTO: 172 X10^3/UL (ref 140–400)
POTASSIUM SERPL-SCNC: 3.9 MMOL/L (ref 3.5–5.1)
PROT SERPL-MCNC: 5 G/DL (ref 6.4–8.2)
RBC # BLD AUTO: 2.9 X10^6/UL (ref 3.5–5.4)
RBC #/AREA URNS HPF: (no result) /HPF (ref 0–2)
SODIUM SERPL-SCNC: 132 MMOL/L (ref 136–145)
SP GR UR STRIP: 1.01
SQUAMOUS #/AREA URNS LPF: (no result) /LPF
UROBILINOGEN UR-MCNC: 0.2 MG/DL
WBC # BLD AUTO: 23.5 X10^3/UL (ref 4–11)
WBC #/AREA URNS HPF: (no result) /HPF (ref 0–4)

## 2020-04-23 RX ADMIN — SODIUM CHLORIDE SCH MLS/HR: 0.9 INJECTION, SOLUTION INTRAVENOUS at 22:49

## 2020-04-23 RX ADMIN — PANTOPRAZOLE SODIUM SCH MG: 40 TABLET, DELAYED RELEASE ORAL at 08:01

## 2020-04-23 RX ADMIN — INSULIN LISPRO SCH UNITS: 100 INJECTION, SOLUTION INTRAVENOUS; SUBCUTANEOUS at 08:00

## 2020-04-23 RX ADMIN — ALLOPURINOL SCH MG: 100 TABLET ORAL at 08:04

## 2020-04-23 RX ADMIN — VANCOMYCIN HYDROCHLORIDE SCH MG: 500 INJECTION, POWDER, LYOPHILIZED, FOR SOLUTION INTRAVENOUS at 08:02

## 2020-04-23 RX ADMIN — VANCOMYCIN HYDROCHLORIDE SCH MG: 500 INJECTION, POWDER, LYOPHILIZED, FOR SOLUTION INTRAVENOUS at 13:15

## 2020-04-23 RX ADMIN — ASPIRIN 81 MG SCH MG: 81 TABLET ORAL at 08:02

## 2020-04-23 RX ADMIN — Medication SCH MCG: at 08:03

## 2020-04-23 RX ADMIN — INSULIN LISPRO SCH UNITS: 100 INJECTION, SOLUTION INTRAVENOUS; SUBCUTANEOUS at 17:00

## 2020-04-23 RX ADMIN — SODIUM CHLORIDE SCH MLS/HR: 0.9 INJECTION, SOLUTION INTRAVENOUS at 00:10

## 2020-04-23 RX ADMIN — VANCOMYCIN HYDROCHLORIDE SCH MG: 500 INJECTION, POWDER, LYOPHILIZED, FOR SOLUTION INTRAVENOUS at 17:03

## 2020-04-23 RX ADMIN — SODIUM CHLORIDE SCH MLS/HR: 0.9 INJECTION, SOLUTION INTRAVENOUS at 08:01

## 2020-04-23 RX ADMIN — GLIMEPIRIDE SCH MG: 2 TABLET ORAL at 08:02

## 2020-04-23 RX ADMIN — VANCOMYCIN HYDROCHLORIDE SCH MG: 500 INJECTION, POWDER, LYOPHILIZED, FOR SOLUTION INTRAVENOUS at 20:40

## 2020-04-23 RX ADMIN — SODIUM CHLORIDE SCH MLS/HR: 0.9 INJECTION, SOLUTION INTRAVENOUS at 13:16

## 2020-04-23 RX ADMIN — Medication SCH CAP: at 08:02

## 2020-04-23 RX ADMIN — Medication SCH TAB: at 08:19

## 2020-04-23 RX ADMIN — INSULIN LISPRO SCH UNITS: 100 INJECTION, SOLUTION INTRAVENOUS; SUBCUTANEOUS at 12:00

## 2020-04-23 RX ADMIN — GABAPENTIN SCH MG: 100 CAPSULE ORAL at 20:40

## 2020-04-23 NOTE — PN
DATE:  04/23/2020



SUBJECTIVE:  The patient is resting, slightly propped up in bed, no apparent

distress, sleepy but arousable on questioning her.  She obviously continued to

have diarrhea, although much less than yesterday.  Denied any abdominal pain. 

Denied any nausea, vomiting; however, she continued to have anorexia and poor

oral intake.



PHYSICAL EXAMINATION:

GENERAL:  When I examined her, she looked pale, but no jaundice, cyanosis or

thyromegaly.  No jugular venous distention.  No lower limb edema.

VITAL SIGNS:  Her heart rate was 92, blood pressure was 95/60, temperature was

98.1, respiratory rate was 18 and oxygen saturation was 99% on room air.

HEAD, EYES, EARS, NOSE AND THROAT:  Showed normocephalic, atraumatic.

NECK:  Supple.

CARDIAC:  Normal first and second heart sounds.  No gallop, rub or murmur.

CHEST:  Clear to auscultation.  No crepitation or rhonchi.

ABDOMEN:  Distended, soft, nontender.  No guarding or rigidity.  No

organomegaly.  All hernial orifices intact.  Bowel sounds normal.

NEUROLOGIC:  She is awake, alert, responding appropriately.  All cranial nerves

intact.  She moves extremities without difficulty.



Her intake over the last 24 hours and output were incompletely recorded.



LABORATORY DATA:  Her lab work this morning showed a serum sodium of 132,

potassium 3.9, chloride 100, bicarbonate 21, anion gap of 11, BUN 57, creatinine

3.3, estimated GFR was 13.7 mL.  Her glucose was 48, calcium was 8.  Total

bilirubin, AST, ALT, alkaline phosphatase were normal.  Total protein was 5 and

albumin was 1.8.  Her white cell count is down to 23,500, hemoglobin 8.9,

hematocrit 27, MCV 94 and platelet count of 172,000.



ASSESSMENT:

1.  Intractable diarrhea, most likely due to Clostridium difficile colitis.

2.  Acute on chronic kidney injury, resolving.  Her creatinine is coming down

from 3.8 to 3.3.

3.  Hyponatremia, improved.  Her serum sodium went up from 127 to 132.



OTHER MEDICAL PROBLEMS:  Include:

A.  Insulin-requiring type 2 diabetes mellitus.

B.  Hypertension, however, the patient is hypotensive.

C.  Diabetic neuropathy and also diabetic autonomic neuropathy.

D.  Hyperlipidemia as well as normochromic normocytic anemia.



PLAN:  To continue with IV fluid.  The patient was given another liter of normal

saline bolus and increase at a rate of 150 mL per hour.  Continue with oral

vancomycin 125 mg 4 times a day together with probiotic and other medications. 

Continue to monitor blood sugar and adjust insulin as needed.





______________________________

ALICIA MOLINA MD



DR:  CONSUELO/kathy  JOB#:  [id number] / [id number]

DD:  04/23/2020 11:25  DT:  04/23/2020 11:42

## 2020-04-23 NOTE — NUR
NURSING NOTE 



PT BLOOD PRESSURE THIS AM WAS LOW, RECHECKED AFTER SHIFT CHANGE, PT FEELS DIZZY, SPOKE WITH 
DR MOLINA, ORDER OBTAINED FOR 1 LITER BOLUS. FINISHING UP NOW, WILL CONTINUE TO MONITOR.





LESLIE WATTS.

## 2020-04-24 VITALS — DIASTOLIC BLOOD PRESSURE: 62 MMHG | SYSTOLIC BLOOD PRESSURE: 98 MMHG

## 2020-04-24 VITALS — SYSTOLIC BLOOD PRESSURE: 117 MMHG | DIASTOLIC BLOOD PRESSURE: 60 MMHG

## 2020-04-24 VITALS — DIASTOLIC BLOOD PRESSURE: 58 MMHG | SYSTOLIC BLOOD PRESSURE: 91 MMHG

## 2020-04-24 VITALS — SYSTOLIC BLOOD PRESSURE: 119 MMHG | DIASTOLIC BLOOD PRESSURE: 73 MMHG

## 2020-04-24 VITALS — DIASTOLIC BLOOD PRESSURE: 53 MMHG | SYSTOLIC BLOOD PRESSURE: 94 MMHG

## 2020-04-24 LAB
ALBUMIN SERPL-MCNC: 1.6 G/DL (ref 3.4–5)
ALBUMIN/GLOB SERPL: 0.5 {RATIO} (ref 1–1.7)
ALP SERPL-CCNC: 92 U/L (ref 46–116)
ALT SERPL-CCNC: 22 U/L (ref 14–59)
ANION GAP SERPL CALC-SCNC: 11 MMOL/L (ref 6–14)
AST SERPL-CCNC: 26 U/L (ref 15–37)
BILIRUB SERPL-MCNC: 0.4 MG/DL (ref 0.2–1)
BUN/CREAT SERPL: 18 (ref 6–20)
CA-I SERPL ISE-MCNC: 55 MG/DL (ref 7–20)
CALCIUM SERPL-MCNC: 7.9 MG/DL (ref 8.5–10.1)
CHLORIDE SERPL-SCNC: 102 MMOL/L (ref 98–107)
CO2 SERPL-SCNC: 19 MMOL/L (ref 21–32)
CREAT SERPL-MCNC: 3 MG/DL (ref 0.6–1)
ERYTHROCYTE [DISTWIDTH] IN BLOOD BY AUTOMATED COUNT: 16.6 % (ref 11.5–14.5)
GFR SERPLBLD BASED ON 1.73 SQ M-ARVRAT: 15.3 ML/MIN
GLOBULIN SER-MCNC: 3.3 G/DL (ref 2.2–3.8)
GLUCOSE SERPL-MCNC: 177 MG/DL (ref 70–99)
HCT VFR BLD CALC: 27.6 % (ref 36–47)
HGB BLD-MCNC: 9 G/DL (ref 12–15.5)
MCH RBC QN AUTO: 31 PG (ref 25–35)
MCHC RBC AUTO-ENTMCNC: 33 G/DL (ref 31–37)
MCV RBC AUTO: 95 FL (ref 79–100)
PLATELET # BLD AUTO: 158 X10^3/UL (ref 140–400)
POTASSIUM SERPL-SCNC: 4.1 MMOL/L (ref 3.5–5.1)
PROT SERPL-MCNC: 4.9 G/DL (ref 6.4–8.2)
RBC # BLD AUTO: 2.91 X10^6/UL (ref 3.5–5.4)
SODIUM SERPL-SCNC: 132 MMOL/L (ref 136–145)
WBC # BLD AUTO: 18.5 X10^3/UL (ref 4–11)

## 2020-04-24 RX ADMIN — ALLOPURINOL SCH MG: 100 TABLET ORAL at 08:11

## 2020-04-24 RX ADMIN — INSULIN LISPRO SCH UNITS: 100 INJECTION, SOLUTION INTRAVENOUS; SUBCUTANEOUS at 17:00

## 2020-04-24 RX ADMIN — VANCOMYCIN HYDROCHLORIDE SCH MG: 500 INJECTION, POWDER, LYOPHILIZED, FOR SOLUTION INTRAVENOUS at 17:00

## 2020-04-24 RX ADMIN — Medication SCH CAP: at 08:10

## 2020-04-24 RX ADMIN — Medication SCH MCG: at 08:11

## 2020-04-24 RX ADMIN — SODIUM CHLORIDE SCH MLS/HR: 0.9 INJECTION, SOLUTION INTRAVENOUS at 21:23

## 2020-04-24 RX ADMIN — VANCOMYCIN HYDROCHLORIDE SCH MG: 500 INJECTION, POWDER, LYOPHILIZED, FOR SOLUTION INTRAVENOUS at 21:22

## 2020-04-24 RX ADMIN — INSULIN LISPRO SCH UNITS: 100 INJECTION, SOLUTION INTRAVENOUS; SUBCUTANEOUS at 08:00

## 2020-04-24 RX ADMIN — GLIMEPIRIDE SCH MG: 2 TABLET ORAL at 08:10

## 2020-04-24 RX ADMIN — GABAPENTIN SCH MG: 100 CAPSULE ORAL at 21:21

## 2020-04-24 RX ADMIN — SODIUM CHLORIDE SCH MLS/HR: 0.9 INJECTION, SOLUTION INTRAVENOUS at 09:57

## 2020-04-24 RX ADMIN — CHOLESTYRAMINE SCH GM: 4 POWDER, FOR SUSPENSION ORAL at 21:21

## 2020-04-24 RX ADMIN — SODIUM CHLORIDE SCH MLS/HR: 0.9 INJECTION, SOLUTION INTRAVENOUS at 03:17

## 2020-04-24 RX ADMIN — VANCOMYCIN HYDROCHLORIDE SCH MG: 500 INJECTION, POWDER, LYOPHILIZED, FOR SOLUTION INTRAVENOUS at 12:26

## 2020-04-24 RX ADMIN — PANTOPRAZOLE SODIUM SCH MG: 40 TABLET, DELAYED RELEASE ORAL at 08:10

## 2020-04-24 RX ADMIN — VANCOMYCIN HYDROCHLORIDE SCH MG: 500 INJECTION, POWDER, LYOPHILIZED, FOR SOLUTION INTRAVENOUS at 08:27

## 2020-04-24 RX ADMIN — Medication SCH TAB: at 09:00

## 2020-04-24 RX ADMIN — ASPIRIN 81 MG SCH MG: 81 TABLET ORAL at 08:10

## 2020-04-24 RX ADMIN — Medication PRN SPRAY: at 21:21

## 2020-04-24 RX ADMIN — Medication PRN SPRAY: at 17:13

## 2020-04-24 RX ADMIN — INSULIN LISPRO SCH UNITS: 100 INJECTION, SOLUTION INTRAVENOUS; SUBCUTANEOUS at 11:52

## 2020-04-24 NOTE — PN
DATE:  04/24/2020



SUBJECTIVE:  The patient is resting, slightly propped up in bed, no apparent

distress.  She denied any nausea or vomiting.  She has no pain as she was

sitting in her bed, although she does have some pain when she moves.  She

continues to have some dizziness or lightheadedness.  Has had 3 loose bowel

movements today.  She is making more urine, now it is little bit darker.



PHYSICAL EXAMINATION:

GENERAL:  When I saw her this morning, she looked pale, not jaundiced, cyanosis

or thyromegaly.  No jugular venous distention.  No limb edema.

VITAL SIGNS:  Her heart rate was 100, blood pressure was 119/73, temperature was

98, respiratory rate 20, and oxygen saturation was 99%.

HEAD, EYES, EARS, NOSE AND THROAT:  Showed normocephalic, atraumatic.

NECK:  Supple.

HEART:  Showed normal first and second heart sounds.  No gallop, rub or murmur.

CHEST:  Clear to auscultation.  No crepitation or rhonchi.

ABDOMEN:  Distended, soft.  Very mild tenderness.  No guarding or rigidity.  No

organomegaly.  All hernial orifice intact.  Bowel sounds normal.

NEUROLOGIC:  She is awake, alert, responding appropriately.  All cranial nerves

intact.  She moves extremities without difficulty.  She ambulates without

assistance or assistive devices.



Her intake over the last 24 hours was 1000, output was ____.



LABORATORY DATA:  Her lab work this morning showed a white cell count of 18,500,

hemoglobin 9, hematocrit 27, MCV 95, and a platelet count of 158,000.  Serum

sodium is 132, potassium 4.1, chloride 102, bicarbonate 19, anion gap of 11, BUN

55, creatinine 3.  Her blood glucose 177, calcium was 7.9.  Total bilirubin,

AST, ALT, alkaline phosphatase were normal.  Total protein was 4.9, albumin was

1.6.  Urinalysis was essentially unremarkable.  Her blood cultures are so far

negative.  Her stool for C. diff was positive; however, was negative for 



DICTATION ENDS HERE





______________________________

ALICIA MOLINA MD



DR:  CONSUELO/kathy  JOB#:  [id number] / [id number]

DD:  04/24/2020 13:57  DT:  04/24/2020 14:45

## 2020-04-24 NOTE — NUR
NURSING NOTE

PATIENT SPENT MOST OF THE DAY IN A BED, PT C/O WEAKNESS AND DIZZINESS, NEED ASSIST X 1 WITH 
ADLS. PATIENT STATED HER MOUTH IS DRY AND REQUESTED ORAL MOISTURIZER. BIOTIN ORAL STRAY 
ORDERED. PT C/O ABDOMINAL PAIN, THAT  IS TENDER TO TOUCH. ABDOMEN APPEARS DISTENDED. PATIENT 
HAD BOWEL MOVEMENT X 3 THIS SHIFT LOOSE WATERY STOOL, NO BLOOD PRESENT. CONTINUE TO MONITOR.

## 2020-04-25 VITALS — DIASTOLIC BLOOD PRESSURE: 60 MMHG | SYSTOLIC BLOOD PRESSURE: 102 MMHG

## 2020-04-25 VITALS — DIASTOLIC BLOOD PRESSURE: 63 MMHG | SYSTOLIC BLOOD PRESSURE: 105 MMHG

## 2020-04-25 VITALS — SYSTOLIC BLOOD PRESSURE: 107 MMHG | DIASTOLIC BLOOD PRESSURE: 58 MMHG

## 2020-04-25 VITALS — DIASTOLIC BLOOD PRESSURE: 58 MMHG | SYSTOLIC BLOOD PRESSURE: 100 MMHG

## 2020-04-25 VITALS — DIASTOLIC BLOOD PRESSURE: 66 MMHG | SYSTOLIC BLOOD PRESSURE: 100 MMHG

## 2020-04-25 LAB
ALBUMIN SERPL-MCNC: 1.7 G/DL (ref 3.4–5)
ALBUMIN/GLOB SERPL: 0.5 {RATIO} (ref 1–1.7)
ALP SERPL-CCNC: 82 U/L (ref 46–116)
ALT SERPL-CCNC: 19 U/L (ref 14–59)
ANION GAP SERPL CALC-SCNC: 12 MMOL/L (ref 6–14)
AST SERPL-CCNC: 19 U/L (ref 15–37)
BILIRUB SERPL-MCNC: 0.6 MG/DL (ref 0.2–1)
BUN/CREAT SERPL: 17 (ref 6–20)
CA-I SERPL ISE-MCNC: 54 MG/DL (ref 7–20)
CALCIUM SERPL-MCNC: 8 MG/DL (ref 8.5–10.1)
CHLORIDE SERPL-SCNC: 103 MMOL/L (ref 98–107)
CO2 SERPL-SCNC: 16 MMOL/L (ref 21–32)
CREAT SERPL-MCNC: 3.1 MG/DL (ref 0.6–1)
ERYTHROCYTE [DISTWIDTH] IN BLOOD BY AUTOMATED COUNT: 16.6 % (ref 11.5–14.5)
GFR SERPLBLD BASED ON 1.73 SQ M-ARVRAT: 14.8 ML/MIN
GLOBULIN SER-MCNC: 3.2 G/DL (ref 2.2–3.8)
GLUCOSE SERPL-MCNC: 113 MG/DL (ref 70–99)
HCT VFR BLD CALC: 27.5 % (ref 36–47)
HGB BLD-MCNC: 9 G/DL (ref 12–15.5)
MCH RBC QN AUTO: 31 PG (ref 25–35)
MCHC RBC AUTO-ENTMCNC: 33 G/DL (ref 31–37)
MCV RBC AUTO: 96 FL (ref 79–100)
PLATELET # BLD AUTO: 163 X10^3/UL (ref 140–400)
POTASSIUM SERPL-SCNC: 4.1 MMOL/L (ref 3.5–5.1)
PROT SERPL-MCNC: 4.9 G/DL (ref 6.4–8.2)
RBC # BLD AUTO: 2.87 X10^6/UL (ref 3.5–5.4)
SODIUM SERPL-SCNC: 131 MMOL/L (ref 136–145)
WBC # BLD AUTO: 19.2 X10^3/UL (ref 4–11)

## 2020-04-25 RX ADMIN — GABAPENTIN SCH MG: 100 CAPSULE ORAL at 21:44

## 2020-04-25 RX ADMIN — VANCOMYCIN HYDROCHLORIDE SCH MG: 500 INJECTION, POWDER, LYOPHILIZED, FOR SOLUTION INTRAVENOUS at 21:00

## 2020-04-25 RX ADMIN — VANCOMYCIN HYDROCHLORIDE SCH MG: 500 INJECTION, POWDER, LYOPHILIZED, FOR SOLUTION INTRAVENOUS at 07:58

## 2020-04-25 RX ADMIN — INSULIN LISPRO SCH UNITS: 100 INJECTION, SOLUTION INTRAVENOUS; SUBCUTANEOUS at 17:00

## 2020-04-25 RX ADMIN — GLIMEPIRIDE SCH MG: 2 TABLET ORAL at 07:57

## 2020-04-25 RX ADMIN — INSULIN LISPRO SCH UNITS: 100 INJECTION, SOLUTION INTRAVENOUS; SUBCUTANEOUS at 12:04

## 2020-04-25 RX ADMIN — ASPIRIN 81 MG SCH MG: 81 TABLET ORAL at 07:57

## 2020-04-25 RX ADMIN — VANCOMYCIN HYDROCHLORIDE SCH MG: 500 INJECTION, POWDER, LYOPHILIZED, FOR SOLUTION INTRAVENOUS at 12:04

## 2020-04-25 RX ADMIN — SODIUM CHLORIDE SCH MLS/HR: 0.9 INJECTION, SOLUTION INTRAVENOUS at 07:59

## 2020-04-25 RX ADMIN — CHOLESTYRAMINE SCH GM: 4 POWDER, FOR SUSPENSION ORAL at 21:44

## 2020-04-25 RX ADMIN — PANTOPRAZOLE SODIUM SCH MG: 40 TABLET, DELAYED RELEASE ORAL at 07:57

## 2020-04-25 RX ADMIN — Medication SCH TAB: at 07:58

## 2020-04-25 RX ADMIN — VANCOMYCIN HYDROCHLORIDE SCH MG: 500 INJECTION, POWDER, LYOPHILIZED, FOR SOLUTION INTRAVENOUS at 17:00

## 2020-04-25 RX ADMIN — Medication SCH MCG: at 07:58

## 2020-04-25 RX ADMIN — INSULIN LISPRO SCH UNITS: 100 INJECTION, SOLUTION INTRAVENOUS; SUBCUTANEOUS at 07:56

## 2020-04-25 RX ADMIN — CHOLESTYRAMINE SCH GM: 4 POWDER, FOR SUSPENSION ORAL at 07:58

## 2020-04-25 RX ADMIN — Medication SCH CAP: at 07:57

## 2020-04-25 RX ADMIN — SODIUM CHLORIDE SCH MLS/HR: 0.9 INJECTION, SOLUTION INTRAVENOUS at 03:42

## 2020-04-25 RX ADMIN — ALLOPURINOL SCH MG: 100 TABLET ORAL at 07:57

## 2020-04-25 NOTE — NUR
Pt's PCP Ira Singh PA-C called the specimen she sent from her office came back positive 
for CDiff.  Dr. Ghosh notified.

## 2020-04-25 NOTE — PN
DATE:  04/25/2020



SUBJECTIVE:  The patient is resting slightly propped up in bed, in no apparent

respiratory distress.  On questioning her, she did complain of being weak, but

denied any chest pain or shortness of breath.



PHYSICAL EXAMINATION:

GENERAL:  When I examined her, she was pale, but no jaundice, cyanosis or

thyromegaly.  No jugular venous distention.  No limb edema.

VITAL SIGNS:  Her heart rate was 110, blood pressure was 100/58, temperature was

98.9, respiratory rate was 20, and oxygen saturation was actually 100% on room

air.

HEAD, EYES, EARS, NOSE AND THROAT:  Showed normocephalic, atraumatic.

NECK:  Supple.

HEART:  Showed normal first and second heart sounds.  No gallop or murmur.

CHEST:  Clear to auscultation.  No crepitation or rhonchi.

ABDOMEN:  Distended, soft with tympanitic percussion note.  She has some

dullness in the lower abdomen, suprapubic area and both right and left quadrant

area.

NEUROLOGIC:  She is awake, alert, responding appropriately.  All cranial nerves

intact.  She moves extremities without difficulty.  She ambulates without

assistance or assistive devices.



Her intake over the last 24 hours was 2600, no output was recorded.



LABORATORY DATA:  Her lab work  this morning showed her serum sodium is slightly

down at 131, potassium 4.1, chloride 103, bicarbonate 16, anion gap of 12, BUN

54, creatinine 3.1.  Her blood glucose was reasonably controlled.  Calcium was

8.  Total bilirubin, AST, ALT, alkaline phosphatase were normal.  Total protein

was 4.6, albumin was 1.7.  White cell count was 19,200, hemoglobin 9, hematocrit

27, MCV was 96, and platelet count of 163,000.  Her urinalysis was essentially

unremarkable.  Her blood cultures showed no growth after 2 days.  Her stool for

C. diff was positive, but the stool for Salmonella, Shigella and Campylobacter

was all negative.



ASSESSMENT:

1.  Intractable diarrhea due to Clostridium difficile toxins slightly improved.

2.  Acute on chronic kidney injury.  Her creatinine came down from 3.8 to 3. 

Fortunately, creatinine for some reason has risen up to 3.1

3.  Hyponatremia has improved from 127 to 131.

4.  Other medical problems include:

A.  Insulin-requiring type 2 diabetes mellitus.

B.  Hypertension.

C.  Diabetic neuropathy as well as diabetic autonomic neuropathy.

D. Hyperlipidemia.

E.  Normochromic normocytic anemia.



PLAN:  My plan is to continue with IV fluid.  We did scan her bladder and seems

she is retaining urine, so we can put her an indwelling Kent catheter. 

Meanwhile, we will continue with IV fluid and if her kidney function continues

to do poorly, I will arrange for her to have ultrasound to make sure that does

not have any obstruction.  Her CT scan showed that there is no evidence of

intrarenal collecting system calculi or hydronephrosis.  There is small

hypodensity identified in the right kidney measuring 9 mm, probably hyperdense

cyst, but no evidence of obstruction.





______________________________

ALICIA MOLINA MD



DR:  CONSUELO/kathy  JOB#:  [id number] / [id number]

DD:  04/25/2020 14:28  DT:  04/25/2020 14:40

## 2020-04-25 NOTE — NUR
Patient bladder scanned and showing >500. Order for Lopez cath per Dr. Ghosh. 16F lopez 
placed with 10cc balloon.

## 2020-04-26 VITALS — DIASTOLIC BLOOD PRESSURE: 70 MMHG | SYSTOLIC BLOOD PRESSURE: 113 MMHG

## 2020-04-26 VITALS — DIASTOLIC BLOOD PRESSURE: 65 MMHG | SYSTOLIC BLOOD PRESSURE: 108 MMHG

## 2020-04-26 VITALS — DIASTOLIC BLOOD PRESSURE: 58 MMHG | SYSTOLIC BLOOD PRESSURE: 106 MMHG

## 2020-04-26 VITALS — SYSTOLIC BLOOD PRESSURE: 116 MMHG | DIASTOLIC BLOOD PRESSURE: 66 MMHG

## 2020-04-26 VITALS — SYSTOLIC BLOOD PRESSURE: 132 MMHG | DIASTOLIC BLOOD PRESSURE: 68 MMHG

## 2020-04-26 LAB
ALBUMIN SERPL-MCNC: 1.7 G/DL (ref 3.4–5)
ALBUMIN/GLOB SERPL: 0.5 {RATIO} (ref 1–1.7)
ALP SERPL-CCNC: 83 U/L (ref 46–116)
ALT SERPL-CCNC: 15 U/L (ref 14–59)
ANION GAP SERPL CALC-SCNC: 15 MMOL/L (ref 6–14)
AST SERPL-CCNC: 15 U/L (ref 15–37)
BILIRUB SERPL-MCNC: 0.5 MG/DL (ref 0.2–1)
BUN/CREAT SERPL: 17 (ref 6–20)
CA-I SERPL ISE-MCNC: 52 MG/DL (ref 7–20)
CALCIUM SERPL-MCNC: 8.3 MG/DL (ref 8.5–10.1)
CHLORIDE SERPL-SCNC: 103 MMOL/L (ref 98–107)
CO2 SERPL-SCNC: 13 MMOL/L (ref 21–32)
CREAT SERPL-MCNC: 3 MG/DL (ref 0.6–1)
ERYTHROCYTE [DISTWIDTH] IN BLOOD BY AUTOMATED COUNT: 16.7 % (ref 11.5–14.5)
GFR SERPLBLD BASED ON 1.73 SQ M-ARVRAT: 15.3 ML/MIN
GLOBULIN SER-MCNC: 3.4 G/DL (ref 2.2–3.8)
GLUCOSE SERPL-MCNC: 96 MG/DL (ref 70–99)
HCT VFR BLD CALC: 26.8 % (ref 36–47)
HGB BLD-MCNC: 8.8 G/DL (ref 12–15.5)
MCH RBC QN AUTO: 31 PG (ref 25–35)
MCHC RBC AUTO-ENTMCNC: 33 G/DL (ref 31–37)
MCV RBC AUTO: 94 FL (ref 79–100)
PLATELET # BLD AUTO: 176 X10^3/UL (ref 140–400)
POTASSIUM SERPL-SCNC: 4.2 MMOL/L (ref 3.5–5.1)
PROT SERPL-MCNC: 5.1 G/DL (ref 6.4–8.2)
RBC # BLD AUTO: 2.85 X10^6/UL (ref 3.5–5.4)
SODIUM SERPL-SCNC: 131 MMOL/L (ref 136–145)
WBC # BLD AUTO: 18.3 X10^3/UL (ref 4–11)

## 2020-04-26 RX ADMIN — SODIUM BICARBONATE SCH MG: 650 TABLET ORAL at 20:24

## 2020-04-26 RX ADMIN — GLIMEPIRIDE SCH MG: 2 TABLET ORAL at 08:00

## 2020-04-26 RX ADMIN — VANCOMYCIN HYDROCHLORIDE SCH MG: 500 INJECTION, POWDER, LYOPHILIZED, FOR SOLUTION INTRAVENOUS at 20:24

## 2020-04-26 RX ADMIN — VANCOMYCIN HYDROCHLORIDE SCH MG: 500 INJECTION, POWDER, LYOPHILIZED, FOR SOLUTION INTRAVENOUS at 12:34

## 2020-04-26 RX ADMIN — PANTOPRAZOLE SODIUM SCH MG: 40 TABLET, DELAYED RELEASE ORAL at 08:00

## 2020-04-26 RX ADMIN — ALLOPURINOL SCH MG: 100 TABLET ORAL at 08:00

## 2020-04-26 RX ADMIN — Medication SCH TAB: at 08:02

## 2020-04-26 RX ADMIN — Medication SCH CAP: at 08:00

## 2020-04-26 RX ADMIN — VANCOMYCIN HYDROCHLORIDE SCH MG: 500 INJECTION, POWDER, LYOPHILIZED, FOR SOLUTION INTRAVENOUS at 17:00

## 2020-04-26 RX ADMIN — ASPIRIN 81 MG SCH MG: 81 TABLET ORAL at 08:01

## 2020-04-26 RX ADMIN — CHOLESTYRAMINE SCH GM: 4 POWDER, FOR SUSPENSION ORAL at 08:00

## 2020-04-26 RX ADMIN — CHOLESTYRAMINE SCH GM: 4 POWDER, FOR SUSPENSION ORAL at 20:24

## 2020-04-26 RX ADMIN — SODIUM BICARBONATE SCH MG: 650 TABLET ORAL at 15:22

## 2020-04-26 RX ADMIN — SODIUM CHLORIDE SCH MLS/HR: 0.9 INJECTION, SOLUTION INTRAVENOUS at 20:24

## 2020-04-26 RX ADMIN — INSULIN LISPRO SCH UNITS: 100 INJECTION, SOLUTION INTRAVENOUS; SUBCUTANEOUS at 12:33

## 2020-04-26 RX ADMIN — SODIUM CHLORIDE SCH MLS/HR: 0.9 INJECTION, SOLUTION INTRAVENOUS at 09:42

## 2020-04-26 RX ADMIN — GABAPENTIN SCH MG: 100 CAPSULE ORAL at 20:24

## 2020-04-26 RX ADMIN — SODIUM CHLORIDE SCH MLS/HR: 0.9 INJECTION, SOLUTION INTRAVENOUS at 00:26

## 2020-04-26 RX ADMIN — INSULIN LISPRO SCH UNITS: 100 INJECTION, SOLUTION INTRAVENOUS; SUBCUTANEOUS at 17:03

## 2020-04-26 RX ADMIN — VANCOMYCIN HYDROCHLORIDE SCH MG: 500 INJECTION, POWDER, LYOPHILIZED, FOR SOLUTION INTRAVENOUS at 08:01

## 2020-04-26 RX ADMIN — Medication SCH MCG: at 08:00

## 2020-04-26 RX ADMIN — INSULIN LISPRO SCH UNITS: 100 INJECTION, SOLUTION INTRAVENOUS; SUBCUTANEOUS at 08:00

## 2020-04-26 NOTE — PN
DATE:  04/26/2020



SUBJECTIVE:  The patient is resting, slightly propped up in bed, in no apparent

distress.  She continued to complain of being weak and dizzy.  She has one loose

bowel movement.  Her appetite is improving, eating more.  The abdomen continued

to be distended.



PHYSICAL EXAMINATION:

GENERAL:  When I examined her, she was pale, but no jaundice, cyanosis or

thyromegaly.  No jugular venous distention.  No limb edema.

VITAL SIGNS:  Her heart rate was 40, blood pressure was 116/66, temperature

98.7, respiratory rate was 20, and oxygen saturation was 92% on room air.

HEAD, EYES, EARS, NOSE AND THROAT:  Normocephalic, atraumatic.

NECK:  Supple.

HEART:  Showed normal first and second sounds.  No gallop, rub or murmur.

CHEST:  Clear to auscultation.  No crepitation or rhonchi.

ABDOMEN:  Distended, soft.  There is no tenderness.  No guarding or rigidity. 

No organomegaly.  All hernial orifice intact.  Bowel sounds normal.

NEUROLOGIC:  She is awake, alert, responding appropriately.  All cranial nerves

intact.  She moves extremities without difficulty.  She ambulates without

assistance or assistive devices.



Her intake over the last 24 hours was 2618.  No output was recorded.



LABORATORY DATA:  Her lab work this morning showed a white cell count of 18,300,

hemoglobin 8.8, hematocrit 27, MCV 94 and platelet count of 176,000.  Her

chemistry this morning showed a serum sodium 131, potassium 4.2, chloride 103,

bicarbonate 13, anion gap of 15, BUN 52, creatinine 3, estimated GFR was 15 mL

per minute.  Her glucose was 96, calcium was 8.3.  Total bilirubin, AST, ALT,

alkaline phosphatase are normal.  Total protein 5.1, albumin was 1.7.



ASSESSMENT:

1.  Intractable diarrhea due to Clostridium difficile toxin slightly improved.

2.  Acute on chronic kidney injury.  Her creatinine came down from 3.83 and

unfortunately plateaus there.

3.  Hyponatremia, has improved from 127 to 131.

4.  Other medical problems include:

A. Insulin-requiring type 2 diabetes mellitus.

B.  Hypertension.

C.  Diabetic neuropathy as well as diabetic autonomic neuropathy.

D.  Hyperlipidemia.

E.  Normochromic normocytic anemia.

F.  She has also high anion gap acidosis.  I will add some sodium bicarbonate

and if there is no dramatic change I will arrange for her to have CT scan of the

abdomen and pelvis tomorrow without contrast.





______________________________

ALICIA MOLINA MD



DR:  CONSUELO/kathy  JOB#:  [id number] / [id number]

DD:  04/26/2020 11:36  DT:  04/26/2020 11:48

## 2020-04-27 VITALS — DIASTOLIC BLOOD PRESSURE: 67 MMHG | SYSTOLIC BLOOD PRESSURE: 107 MMHG

## 2020-04-27 VITALS — DIASTOLIC BLOOD PRESSURE: 60 MMHG | SYSTOLIC BLOOD PRESSURE: 102 MMHG

## 2020-04-27 VITALS — SYSTOLIC BLOOD PRESSURE: 112 MMHG | DIASTOLIC BLOOD PRESSURE: 69 MMHG

## 2020-04-27 VITALS — DIASTOLIC BLOOD PRESSURE: 59 MMHG | SYSTOLIC BLOOD PRESSURE: 105 MMHG

## 2020-04-27 VITALS — SYSTOLIC BLOOD PRESSURE: 102 MMHG | DIASTOLIC BLOOD PRESSURE: 66 MMHG

## 2020-04-27 LAB
ANION GAP SERPL CALC-SCNC: 12 MMOL/L (ref 6–14)
CA-I SERPL ISE-MCNC: 50 MG/DL (ref 7–20)
CALCIUM SERPL-MCNC: 8.1 MG/DL (ref 8.5–10.1)
CHLORIDE SERPL-SCNC: 106 MMOL/L (ref 98–107)
CO2 SERPL-SCNC: 15 MMOL/L (ref 21–32)
CREAT SERPL-MCNC: 2.8 MG/DL (ref 0.6–1)
ERYTHROCYTE [DISTWIDTH] IN BLOOD BY AUTOMATED COUNT: 16.9 % (ref 11.5–14.5)
GFR SERPLBLD BASED ON 1.73 SQ M-ARVRAT: 16.6 ML/MIN
GLUCOSE SERPL-MCNC: 127 MG/DL (ref 70–99)
HCT VFR BLD CALC: 25.3 % (ref 36–47)
HGB BLD-MCNC: 8.2 G/DL (ref 12–15.5)
MCH RBC QN AUTO: 31 PG (ref 25–35)
MCHC RBC AUTO-ENTMCNC: 32 G/DL (ref 31–37)
MCV RBC AUTO: 95 FL (ref 79–100)
PLATELET # BLD AUTO: 155 X10^3/UL (ref 140–400)
POTASSIUM SERPL-SCNC: 4.3 MMOL/L (ref 3.5–5.1)
RBC # BLD AUTO: 2.65 X10^6/UL (ref 3.5–5.4)
SODIUM SERPL-SCNC: 133 MMOL/L (ref 136–145)
WBC # BLD AUTO: 14 X10^3/UL (ref 4–11)

## 2020-04-27 RX ADMIN — VANCOMYCIN HYDROCHLORIDE SCH MG: 500 INJECTION, POWDER, LYOPHILIZED, FOR SOLUTION INTRAVENOUS at 17:45

## 2020-04-27 RX ADMIN — PANTOPRAZOLE SODIUM SCH MG: 40 TABLET, DELAYED RELEASE ORAL at 07:48

## 2020-04-27 RX ADMIN — Medication SCH MCG: at 07:49

## 2020-04-27 RX ADMIN — CHOLESTYRAMINE SCH GM: 4 POWDER, FOR SUSPENSION ORAL at 20:50

## 2020-04-27 RX ADMIN — VANCOMYCIN HYDROCHLORIDE SCH MG: 500 INJECTION, POWDER, LYOPHILIZED, FOR SOLUTION INTRAVENOUS at 07:48

## 2020-04-27 RX ADMIN — SODIUM BICARBONATE SCH MG: 650 TABLET ORAL at 12:15

## 2020-04-27 RX ADMIN — SODIUM BICARBONATE SCH MG: 650 TABLET ORAL at 07:47

## 2020-04-27 RX ADMIN — GABAPENTIN SCH MG: 100 CAPSULE ORAL at 20:50

## 2020-04-27 RX ADMIN — SODIUM CHLORIDE SCH MLS/HR: 0.9 INJECTION, SOLUTION INTRAVENOUS at 05:42

## 2020-04-27 RX ADMIN — ASPIRIN 81 MG SCH MG: 81 TABLET ORAL at 07:48

## 2020-04-27 RX ADMIN — VANCOMYCIN HYDROCHLORIDE SCH MG: 500 INJECTION, POWDER, LYOPHILIZED, FOR SOLUTION INTRAVENOUS at 12:15

## 2020-04-27 RX ADMIN — ALLOPURINOL SCH MG: 100 TABLET ORAL at 07:48

## 2020-04-27 RX ADMIN — VANCOMYCIN HYDROCHLORIDE SCH MG: 500 INJECTION, POWDER, LYOPHILIZED, FOR SOLUTION INTRAVENOUS at 20:50

## 2020-04-27 RX ADMIN — SODIUM CHLORIDE SCH MLS/HR: 0.9 INJECTION, SOLUTION INTRAVENOUS at 15:42

## 2020-04-27 RX ADMIN — SODIUM BICARBONATE SCH MG: 650 TABLET ORAL at 20:50

## 2020-04-27 RX ADMIN — CHOLESTYRAMINE SCH GM: 4 POWDER, FOR SUSPENSION ORAL at 07:48

## 2020-04-27 RX ADMIN — INSULIN LISPRO SCH UNITS: 100 INJECTION, SOLUTION INTRAVENOUS; SUBCUTANEOUS at 07:50

## 2020-04-27 RX ADMIN — GLIMEPIRIDE SCH MG: 2 TABLET ORAL at 07:48

## 2020-04-27 RX ADMIN — Medication SCH TAB: at 09:00

## 2020-04-27 RX ADMIN — INSULIN LISPRO SCH UNITS: 100 INJECTION, SOLUTION INTRAVENOUS; SUBCUTANEOUS at 12:15

## 2020-04-27 RX ADMIN — INSULIN LISPRO SCH UNITS: 100 INJECTION, SOLUTION INTRAVENOUS; SUBCUTANEOUS at 17:00

## 2020-04-27 RX ADMIN — Medication SCH CAP: at 07:47

## 2020-04-28 VITALS — SYSTOLIC BLOOD PRESSURE: 106 MMHG | DIASTOLIC BLOOD PRESSURE: 59 MMHG

## 2020-04-28 VITALS — DIASTOLIC BLOOD PRESSURE: 63 MMHG | SYSTOLIC BLOOD PRESSURE: 121 MMHG

## 2020-04-28 VITALS — SYSTOLIC BLOOD PRESSURE: 121 MMHG | DIASTOLIC BLOOD PRESSURE: 64 MMHG

## 2020-04-28 VITALS — SYSTOLIC BLOOD PRESSURE: 110 MMHG | DIASTOLIC BLOOD PRESSURE: 51 MMHG

## 2020-04-28 VITALS — DIASTOLIC BLOOD PRESSURE: 70 MMHG | SYSTOLIC BLOOD PRESSURE: 113 MMHG

## 2020-04-28 LAB
ANION GAP SERPL CALC-SCNC: 13 MMOL/L (ref 6–14)
CA-I SERPL ISE-MCNC: 48 MG/DL (ref 7–20)
CALCIUM SERPL-MCNC: 8.5 MG/DL (ref 8.5–10.1)
CHLORIDE SERPL-SCNC: 103 MMOL/L (ref 98–107)
CO2 SERPL-SCNC: 15 MMOL/L (ref 21–32)
CREAT SERPL-MCNC: 2.8 MG/DL (ref 0.6–1)
ERYTHROCYTE [DISTWIDTH] IN BLOOD BY AUTOMATED COUNT: 17.2 % (ref 11.5–14.5)
GFR SERPLBLD BASED ON 1.73 SQ M-ARVRAT: 16.6 ML/MIN
GLUCOSE SERPL-MCNC: 226 MG/DL (ref 70–99)
HCT VFR BLD CALC: 27.8 % (ref 36–47)
HGB BLD-MCNC: 9 G/DL (ref 12–15.5)
MCH RBC QN AUTO: 31 PG (ref 25–35)
MCHC RBC AUTO-ENTMCNC: 33 G/DL (ref 31–37)
MCV RBC AUTO: 96 FL (ref 79–100)
PLATELET # BLD AUTO: 197 X10^3/UL (ref 140–400)
POTASSIUM SERPL-SCNC: 4.4 MMOL/L (ref 3.5–5.1)
RBC # BLD AUTO: 2.91 X10^6/UL (ref 3.5–5.4)
SODIUM SERPL-SCNC: 131 MMOL/L (ref 136–145)
WBC # BLD AUTO: 15.2 X10^3/UL (ref 4–11)

## 2020-04-28 RX ADMIN — ALLOPURINOL SCH MG: 100 TABLET ORAL at 08:27

## 2020-04-28 RX ADMIN — SODIUM BICARBONATE SCH MG: 650 TABLET ORAL at 21:44

## 2020-04-28 RX ADMIN — ASPIRIN 81 MG SCH MG: 81 TABLET ORAL at 08:28

## 2020-04-28 RX ADMIN — VANCOMYCIN HYDROCHLORIDE SCH MG: 500 INJECTION, POWDER, LYOPHILIZED, FOR SOLUTION INTRAVENOUS at 13:54

## 2020-04-28 RX ADMIN — CHOLESTYRAMINE SCH GM: 4 POWDER, FOR SUSPENSION ORAL at 21:44

## 2020-04-28 RX ADMIN — GLIMEPIRIDE SCH MG: 2 TABLET ORAL at 08:28

## 2020-04-28 RX ADMIN — VANCOMYCIN HYDROCHLORIDE SCH MG: 500 INJECTION, POWDER, LYOPHILIZED, FOR SOLUTION INTRAVENOUS at 08:28

## 2020-04-28 RX ADMIN — Medication SCH TAB: at 09:00

## 2020-04-28 RX ADMIN — INSULIN LISPRO SCH UNITS: 100 INJECTION, SOLUTION INTRAVENOUS; SUBCUTANEOUS at 17:32

## 2020-04-28 RX ADMIN — SODIUM BICARBONATE SCH MG: 650 TABLET ORAL at 08:28

## 2020-04-28 RX ADMIN — Medication SCH CAP: at 08:28

## 2020-04-28 RX ADMIN — SODIUM CHLORIDE SCH MLS/HR: 0.9 INJECTION, SOLUTION INTRAVENOUS at 12:03

## 2020-04-28 RX ADMIN — GABAPENTIN SCH MG: 100 CAPSULE ORAL at 21:44

## 2020-04-28 RX ADMIN — SODIUM BICARBONATE SCH MG: 650 TABLET ORAL at 13:54

## 2020-04-28 RX ADMIN — INSULIN LISPRO SCH UNITS: 100 INJECTION, SOLUTION INTRAVENOUS; SUBCUTANEOUS at 12:04

## 2020-04-28 RX ADMIN — CHOLESTYRAMINE SCH GM: 4 POWDER, FOR SUSPENSION ORAL at 08:29

## 2020-04-28 RX ADMIN — PANTOPRAZOLE SODIUM SCH MG: 40 TABLET, DELAYED RELEASE ORAL at 08:27

## 2020-04-28 RX ADMIN — Medication SCH MCG: at 08:27

## 2020-04-28 RX ADMIN — VANCOMYCIN HYDROCHLORIDE SCH MG: 500 INJECTION, POWDER, LYOPHILIZED, FOR SOLUTION INTRAVENOUS at 21:44

## 2020-04-28 RX ADMIN — SODIUM CHLORIDE SCH MLS/HR: 0.9 INJECTION, SOLUTION INTRAVENOUS at 01:20

## 2020-04-28 RX ADMIN — VANCOMYCIN HYDROCHLORIDE SCH MG: 500 INJECTION, POWDER, LYOPHILIZED, FOR SOLUTION INTRAVENOUS at 17:31

## 2020-04-28 RX ADMIN — INSULIN LISPRO SCH UNITS: 100 INJECTION, SOLUTION INTRAVENOUS; SUBCUTANEOUS at 08:00

## 2020-04-28 NOTE — RAD
Exam: CT of abdomen and pelvis without contrast

 

INDICATION: Worsening symptoms, diarrhea

 

TECHNIQUE: Sequential axial images through the abdomen and pelvis obtained

without IV contrast. Sagittal and coronal reformatted images were 

reconstructed from the axial data and reviewed.

 

Comparisons: None

 

FINDINGS:

Heart size is normal. Pericardial effusion. There is trace left pleural 

effusion. Otherwise, visualized lung bases are clear.

 

Evaluation of the solid organs is limited secondary to noncontrast 

technique.

 

Liver, spleen, pancreas, and adrenals are unremarkable. Gallbladder 

surgically absent.

 

No perinephric inflammation or hydronephrosis. There is a hyperdense 

lesion at the lower pole of the right kidney, likely representing 

hemorrhagic cyst measuring approximately 1 cm.

 

Bladder is decompressed not well evaluated. Kent balloon is noted within 

the bladder. Uterus is nonenlarged. No abnormal adnexal mass.

 

There is diffuse wall thickening involving the colon. Small bowel is 

unremarkable. No free intra-abdominal air. There is moderate amount of 

intra-abdominal ascites. No free intra-abdominal air.

 

Abdominal aorta has a normal course and caliber.

 

No enlarged abdominal lymph nodes are identified.

 

No suspicious osseous lesions or acute fractures.

 

IMPRESSION:

1.  Diffuse wall thickening throughout the colon favored represent diffuse

colitis. This may infectious or inflammatory in etiology.

2.  Moderate amount of intra-abdominal ascites.

 

 

Exposure: One or more of the following in the visualized dose reduction 

techniques were utilized for this examination:

1.  Automated exposure control

2.  Adjustment of the MA and/or KV according to patient size

3.  Use of iterative of reconstructive technique

 

Electronically signed by: Jake Burnett MD (4/28/2020 3:48 PM) [id number]

## 2020-04-29 VITALS — SYSTOLIC BLOOD PRESSURE: 113 MMHG | DIASTOLIC BLOOD PRESSURE: 63 MMHG

## 2020-04-29 VITALS — DIASTOLIC BLOOD PRESSURE: 69 MMHG | SYSTOLIC BLOOD PRESSURE: 135 MMHG

## 2020-04-29 VITALS — DIASTOLIC BLOOD PRESSURE: 72 MMHG | SYSTOLIC BLOOD PRESSURE: 120 MMHG

## 2020-04-29 VITALS — DIASTOLIC BLOOD PRESSURE: 77 MMHG | SYSTOLIC BLOOD PRESSURE: 141 MMHG

## 2020-04-29 VITALS — SYSTOLIC BLOOD PRESSURE: 103 MMHG | DIASTOLIC BLOOD PRESSURE: 53 MMHG

## 2020-04-29 RX ADMIN — VANCOMYCIN HYDROCHLORIDE SCH MG: 500 INJECTION, POWDER, LYOPHILIZED, FOR SOLUTION INTRAVENOUS at 16:53

## 2020-04-29 RX ADMIN — SODIUM BICARBONATE SCH MG: 650 TABLET ORAL at 20:19

## 2020-04-29 RX ADMIN — CHOLESTYRAMINE SCH GM: 4 POWDER, FOR SUSPENSION ORAL at 20:19

## 2020-04-29 RX ADMIN — ALLOPURINOL SCH MG: 100 TABLET ORAL at 07:57

## 2020-04-29 RX ADMIN — VANCOMYCIN HYDROCHLORIDE SCH MG: 500 INJECTION, POWDER, LYOPHILIZED, FOR SOLUTION INTRAVENOUS at 13:37

## 2020-04-29 RX ADMIN — CHOLESTYRAMINE SCH GM: 4 POWDER, FOR SUSPENSION ORAL at 07:58

## 2020-04-29 RX ADMIN — SODIUM CHLORIDE SCH MLS/HR: 0.9 INJECTION, SOLUTION INTRAVENOUS at 20:19

## 2020-04-29 RX ADMIN — Medication SCH MCG: at 07:58

## 2020-04-29 RX ADMIN — INSULIN LISPRO SCH UNITS: 100 INJECTION, SOLUTION INTRAVENOUS; SUBCUTANEOUS at 12:26

## 2020-04-29 RX ADMIN — GABAPENTIN SCH MG: 100 CAPSULE ORAL at 20:19

## 2020-04-29 RX ADMIN — VANCOMYCIN HYDROCHLORIDE SCH MG: 500 INJECTION, POWDER, LYOPHILIZED, FOR SOLUTION INTRAVENOUS at 07:56

## 2020-04-29 RX ADMIN — DIPHENOXYLATE HYDROCHLORIDE AND ATROPINE SULFATE PRN TAB: .025; 2.5 TABLET ORAL at 16:53

## 2020-04-29 RX ADMIN — INSULIN LISPRO SCH UNITS: 100 INJECTION, SOLUTION INTRAVENOUS; SUBCUTANEOUS at 08:00

## 2020-04-29 RX ADMIN — Medication SCH CAP: at 07:57

## 2020-04-29 RX ADMIN — ASPIRIN 81 MG SCH MG: 81 TABLET ORAL at 07:57

## 2020-04-29 RX ADMIN — SODIUM CHLORIDE SCH MLS/HR: 0.9 INJECTION, SOLUTION INTRAVENOUS at 08:09

## 2020-04-29 RX ADMIN — VANCOMYCIN HYDROCHLORIDE SCH MG: 500 INJECTION, POWDER, LYOPHILIZED, FOR SOLUTION INTRAVENOUS at 20:19

## 2020-04-29 RX ADMIN — SODIUM BICARBONATE SCH MG: 650 TABLET ORAL at 07:57

## 2020-04-29 RX ADMIN — DIPHENOXYLATE HYDROCHLORIDE AND ATROPINE SULFATE PRN TAB: .025; 2.5 TABLET ORAL at 09:13

## 2020-04-29 RX ADMIN — SODIUM CHLORIDE SCH MLS/HR: 0.9 INJECTION, SOLUTION INTRAVENOUS at 01:45

## 2020-04-29 RX ADMIN — PANTOPRAZOLE SODIUM SCH MG: 40 TABLET, DELAYED RELEASE ORAL at 07:56

## 2020-04-29 RX ADMIN — SODIUM BICARBONATE SCH MG: 650 TABLET ORAL at 13:37

## 2020-04-29 RX ADMIN — GLIMEPIRIDE SCH MG: 2 TABLET ORAL at 07:57

## 2020-04-29 RX ADMIN — Medication SCH TAB: at 07:58

## 2020-04-29 RX ADMIN — INSULIN LISPRO SCH UNITS: 100 INJECTION, SOLUTION INTRAVENOUS; SUBCUTANEOUS at 17:00

## 2020-04-29 RX ADMIN — SODIUM CHLORIDE SCH MLS/HR: 0.9 INJECTION, SOLUTION INTRAVENOUS at 16:54

## 2020-04-29 NOTE — PN
DATE:  04/29/2020



ATTENDING PHYSICIAN:  Dr. Ghosh.



SUBJECTIVE:  Still weak, 3-4 loose stools, not completely resolved.



OBJECTIVE FINDINGS:

VITAL SIGNS:  Her temperature is 98.3 degrees Fahrenheit, blood pressure 103/53,

pulse 94 and regular, oxygen saturation 97% on room air.

HEENT:  Head is without trauma.  Pupils are reactive.  Oropharynx is clear. 

Mucous membranes moist.

NECK:  Supple, no bruits.

LUNGS:  Clear.

CARDIOVASCULAR:  Showed regular heart tones.

ABDOMEN:  Obese, protuberant.  No organomegaly.  Bowel sounds are hypoactive.

EXTREMITIES:  Show no cyanosis.

NEUROLOGIC:  Focally intact.  Speech is fluent.



PERTINENT LABORATORY STUDIES:  Her hemoglobin yesterday was 9.0 g, white count

15,200.  Chemistry panel showed a creatinine still elevated at 2.8 mg/dL, BUN

48.



ASSESSMENT:

1.  Clostridium difficile pseudomembranous enterocolitis.

2.  Dehydration, rehydrated.

3.  Acute on chronic renal failure.

4.  Metabolic acidosis.

5.  Hyponatremia, improving.

6.  Type 2 diabetes.

7.  Hyperlipidemia.

8.  Normochromic normocytic anemia.



PLAN:

1.  Continue vancomycin as ordered.

2.  I have added Xifaxan 550 p.o. b.i.d.

3.  Lomotil helps to decreased frequency of stools.

4.  Continue hydration.

5.  Follow up chemistries.

6.  Tentative discharge pending her coverage for these medications as an

outpatient.





______________________________

SHAAN NGUYEN MD



DR:  SHANIKA/kathy  JOB#:  [id number] / [id number]

DD:  04/29/2020 08:11  DT:  04/29/2020 08:18



ALICIA Pleitez MD

## 2020-04-30 VITALS — SYSTOLIC BLOOD PRESSURE: 111 MMHG | DIASTOLIC BLOOD PRESSURE: 67 MMHG

## 2020-04-30 VITALS — DIASTOLIC BLOOD PRESSURE: 68 MMHG | SYSTOLIC BLOOD PRESSURE: 119 MMHG

## 2020-04-30 VITALS — SYSTOLIC BLOOD PRESSURE: 129 MMHG | DIASTOLIC BLOOD PRESSURE: 69 MMHG

## 2020-04-30 VITALS — SYSTOLIC BLOOD PRESSURE: 121 MMHG | DIASTOLIC BLOOD PRESSURE: 68 MMHG

## 2020-04-30 VITALS — DIASTOLIC BLOOD PRESSURE: 66 MMHG | SYSTOLIC BLOOD PRESSURE: 129 MMHG

## 2020-04-30 LAB
ALBUMIN SERPL-MCNC: 1.8 G/DL (ref 3.4–5)
ALBUMIN/GLOB SERPL: 0.5 {RATIO} (ref 1–1.7)
ALP SERPL-CCNC: 89 U/L (ref 46–116)
ALT SERPL-CCNC: 20 U/L (ref 14–59)
ANION GAP SERPL CALC-SCNC: 13 MMOL/L (ref 6–14)
AST SERPL-CCNC: 18 U/L (ref 15–37)
BASOPHILS # BLD AUTO: 0 X10^3/UL (ref 0–0.2)
BASOPHILS NFR BLD: 0 % (ref 0–3)
BILIRUB SERPL-MCNC: 0.4 MG/DL (ref 0.2–1)
BUN/CREAT SERPL: 17 (ref 6–20)
CA-I SERPL ISE-MCNC: 39 MG/DL (ref 7–20)
CALCIUM SERPL-MCNC: 8.5 MG/DL (ref 8.5–10.1)
CHLORIDE SERPL-SCNC: 108 MMOL/L (ref 98–107)
CO2 SERPL-SCNC: 15 MMOL/L (ref 21–32)
CREAT SERPL-MCNC: 2.3 MG/DL (ref 0.6–1)
EOSINOPHIL NFR BLD: 0.3 X10^3/UL (ref 0–0.7)
EOSINOPHIL NFR BLD: 2 % (ref 0–3)
ERYTHROCYTE [DISTWIDTH] IN BLOOD BY AUTOMATED COUNT: 17.3 % (ref 11.5–14.5)
GFR SERPLBLD BASED ON 1.73 SQ M-ARVRAT: 20.8 ML/MIN
GLOBULIN SER-MCNC: 3.6 G/DL (ref 2.2–3.8)
GLUCOSE SERPL-MCNC: 157 MG/DL (ref 70–99)
HCT VFR BLD CALC: 28.6 % (ref 36–47)
HGB BLD-MCNC: 9.2 G/DL (ref 12–15.5)
LYMPHOCYTES # BLD: 1.6 X10^3/UL (ref 1–4.8)
LYMPHOCYTES NFR BLD AUTO: 12 % (ref 24–48)
MAGNESIUM SERPL-MCNC: 1.8 MG/DL (ref 1.8–2.4)
MCH RBC QN AUTO: 31 PG (ref 25–35)
MCHC RBC AUTO-ENTMCNC: 32 G/DL (ref 31–37)
MCV RBC AUTO: 97 FL (ref 79–100)
MONO #: 0.6 X10^3/UL (ref 0–1.1)
MONOCYTES NFR BLD: 5 % (ref 0–9)
NEUT #: 10.5 X10^3UL (ref 1.8–7.7)
NEUTROPHILS NFR BLD AUTO: 80 % (ref 31–73)
PLATELET # BLD AUTO: 223 X10^3/UL (ref 140–400)
POTASSIUM SERPL-SCNC: 4.4 MMOL/L (ref 3.5–5.1)
PROT SERPL-MCNC: 5.4 G/DL (ref 6.4–8.2)
RBC # BLD AUTO: 2.95 X10^6/UL (ref 3.5–5.4)
SODIUM SERPL-SCNC: 136 MMOL/L (ref 136–145)
WBC # BLD AUTO: 13 X10^3/UL (ref 4–11)

## 2020-04-30 RX ADMIN — GABAPENTIN SCH MG: 100 CAPSULE ORAL at 20:04

## 2020-04-30 RX ADMIN — ALLOPURINOL SCH MG: 100 TABLET ORAL at 08:09

## 2020-04-30 RX ADMIN — Medication SCH TAB: at 07:27

## 2020-04-30 RX ADMIN — ASPIRIN 81 MG SCH MG: 81 TABLET ORAL at 08:08

## 2020-04-30 RX ADMIN — VANCOMYCIN HYDROCHLORIDE SCH MG: 500 INJECTION, POWDER, LYOPHILIZED, FOR SOLUTION INTRAVENOUS at 08:09

## 2020-04-30 RX ADMIN — SODIUM BICARBONATE SCH MG: 650 TABLET ORAL at 13:04

## 2020-04-30 RX ADMIN — CHOLESTYRAMINE SCH GM: 4 POWDER, FOR SUSPENSION ORAL at 20:04

## 2020-04-30 RX ADMIN — SODIUM BICARBONATE SCH MG: 650 TABLET ORAL at 08:08

## 2020-04-30 RX ADMIN — INSULIN LISPRO SCH UNITS: 100 INJECTION, SOLUTION INTRAVENOUS; SUBCUTANEOUS at 16:40

## 2020-04-30 RX ADMIN — SODIUM CHLORIDE SCH MLS/HR: 0.9 INJECTION, SOLUTION INTRAVENOUS at 05:28

## 2020-04-30 RX ADMIN — INSULIN LISPRO SCH UNITS: 100 INJECTION, SOLUTION INTRAVENOUS; SUBCUTANEOUS at 08:14

## 2020-04-30 RX ADMIN — SODIUM BICARBONATE SCH MG: 650 TABLET ORAL at 20:04

## 2020-04-30 RX ADMIN — CHOLESTYRAMINE SCH GM: 4 POWDER, FOR SUSPENSION ORAL at 08:08

## 2020-04-30 RX ADMIN — Medication SCH CAP: at 08:08

## 2020-04-30 RX ADMIN — PANTOPRAZOLE SODIUM SCH MG: 40 TABLET, DELAYED RELEASE ORAL at 08:08

## 2020-04-30 RX ADMIN — Medication SCH MCG: at 08:08

## 2020-04-30 RX ADMIN — VANCOMYCIN HYDROCHLORIDE SCH MG: 500 INJECTION, POWDER, LYOPHILIZED, FOR SOLUTION INTRAVENOUS at 13:04

## 2020-04-30 RX ADMIN — INSULIN LISPRO SCH UNITS: 100 INJECTION, SOLUTION INTRAVENOUS; SUBCUTANEOUS at 11:53

## 2020-04-30 RX ADMIN — VANCOMYCIN HYDROCHLORIDE SCH MG: 500 INJECTION, POWDER, LYOPHILIZED, FOR SOLUTION INTRAVENOUS at 17:56

## 2020-04-30 RX ADMIN — VANCOMYCIN HYDROCHLORIDE SCH MG: 500 INJECTION, POWDER, LYOPHILIZED, FOR SOLUTION INTRAVENOUS at 20:04

## 2020-04-30 RX ADMIN — GLIMEPIRIDE SCH MG: 2 TABLET ORAL at 08:08

## 2020-04-30 NOTE — NUR
Pt had a good night, stated that she "slept pretty good for being in the hospital." Pt up 
with walker to void 4 times during night, stated that she "feels better on my feet then 
yesterday." Pt only had 1 stool this shift. SCDs on during night.

## 2020-04-30 NOTE — RAD
Examination: PORTABLE CHEST 1V

 

History: Cough, shortness of breath

 

Comparison/Correlation: 4/22/2020 CT chest abdomen and pelvis without 

contrast

 

Findings: Portable frontal view of the chest was obtained. Heart size and 

pulmonary vessels are normal. No infiltrate or pleural effusion. Limited 

pulmonary inflation. No pneumothorax. Bony structures are unremarkable.

 

 

Impression:

No active disease.

 

Electronically signed by: Rickie Watkins MD (4/30/2020 10:57 AM) [id number]

## 2020-04-30 NOTE — NUR
Nursing Note

Pt in room in bed at shift change, has 2+ pitting edema to bilat legs, is SOA with movement 
and is worn out from getting up to void.  States its been a rough winter for her and her 
, that she had gotten CDIFF from 17 days of antibiotics for her sinus infection.  
States Bill her  was in [place] for 30 days after a long hospitalization also.  She 
is concerned about her , he had fallen last night, and her daughter Joanna found him 
on the floor.  States she never thought she would end up like this meaning unable to get up 
and around states shes weak from all of this. In room resting for now.

## 2020-04-30 NOTE — PN
DATE:  04/30/2020



ATTENDING PHYSICIAN:  Dr. Ghosh.



SUBJECTIVE:  She is short of breath and dyspneic with minimal exertion.  Her

weight is up.  Ankles are swollen.  Her bowels have improved.  She has only had

1 bowel movement in the last 24 hours.



OBJECTIVE FINDINGS:

VITAL SIGNS:  Her blood pressure today is 129/66, pulse is 102 and regular.  She

was afebrile, oxygen saturation 96% on room air.

HEENT:  Head is without trauma.  Pupils are reactive.  Sclerae nonicteric.

NECK:  Supple.  Venous pressure is distended at 45 degrees.

LUNGS:  Bibasilar crackles.

CARDIOVASCULAR:  Showed distant heart tones.  No gallops.

ABDOMEN:  Soft.

EXTREMITIES:  Showed 3+ pitting edema extending up to her knees.



ASSESSMENT:

1.  Clostridium difficile pseudomembranous enterocolitis, improving with

decreased bowel movements.

2.  Dehydration, rehydrated.

3.  Acute on chronic renal failure.  Creatinine is down to 2.3 mg percent.

4.  Metabolic acidosis.

5.  Hyponatremia, improving.

6.  Type 2 diabetes.

7.  Component of volume overload.



PLAN:

1.  Continue Xifaxan and vancomycin as ordered.

2.  Followup chest x-ray.

3.  Lasix 100 mg IV now for diuresis.

4.  Serial chemistries.

5.  Lomotil p.r.n. diarrhea.





______________________________

SHAAN NGUYEN MD



DR:  SHANIKA/kathy  JOB#:  [id number] / [id number]

DD:  04/30/2020 08:41  DT:  04/30/2020 08:52



paola Ghosh Dr.

## 2020-04-30 NOTE — NUR
Pt rested comfortably in room throughout shift. Pt given IV lasix for excess fluid 
volume--cough and edema are improved. IVF discontinued. Pt up to bedside commode with 
stand-by assistance. Pt had soft bowel movements x3 this shift.

## 2020-05-01 VITALS — SYSTOLIC BLOOD PRESSURE: 91 MMHG | DIASTOLIC BLOOD PRESSURE: 54 MMHG

## 2020-05-01 VITALS — SYSTOLIC BLOOD PRESSURE: 108 MMHG | DIASTOLIC BLOOD PRESSURE: 68 MMHG

## 2020-05-01 VITALS — DIASTOLIC BLOOD PRESSURE: 63 MMHG | SYSTOLIC BLOOD PRESSURE: 102 MMHG

## 2020-05-01 VITALS — DIASTOLIC BLOOD PRESSURE: 75 MMHG | SYSTOLIC BLOOD PRESSURE: 124 MMHG

## 2020-05-01 VITALS — DIASTOLIC BLOOD PRESSURE: 75 MMHG | SYSTOLIC BLOOD PRESSURE: 135 MMHG

## 2020-05-01 LAB
ALBUMIN SERPL-MCNC: 1.7 G/DL (ref 3.4–5)
ALBUMIN/GLOB SERPL: 0.5 {RATIO} (ref 1–1.7)
ALP SERPL-CCNC: 76 U/L (ref 46–116)
ALT SERPL-CCNC: 19 U/L (ref 14–59)
ANION GAP SERPL CALC-SCNC: 11 MMOL/L (ref 6–14)
AST SERPL-CCNC: 18 U/L (ref 15–37)
BASOPHILS # BLD AUTO: 0 X10^3/UL (ref 0–0.2)
BASOPHILS NFR BLD: 0 % (ref 0–3)
BILIRUB SERPL-MCNC: 0.5 MG/DL (ref 0.2–1)
BUN/CREAT SERPL: 16 (ref 6–20)
CA-I SERPL ISE-MCNC: 36 MG/DL (ref 7–20)
CALCIUM SERPL-MCNC: 8.7 MG/DL (ref 8.5–10.1)
CHLORIDE SERPL-SCNC: 109 MMOL/L (ref 98–107)
CO2 SERPL-SCNC: 17 MMOL/L (ref 21–32)
CREAT SERPL-MCNC: 2.3 MG/DL (ref 0.6–1)
EOSINOPHIL NFR BLD: 0.2 X10^3/UL (ref 0–0.7)
EOSINOPHIL NFR BLD: 3 % (ref 0–3)
ERYTHROCYTE [DISTWIDTH] IN BLOOD BY AUTOMATED COUNT: 17.6 % (ref 11.5–14.5)
GFR SERPLBLD BASED ON 1.73 SQ M-ARVRAT: 20.8 ML/MIN
GLOBULIN SER-MCNC: 3.4 G/DL (ref 2.2–3.8)
GLUCOSE SERPL-MCNC: 147 MG/DL (ref 70–99)
HCT VFR BLD CALC: 25.8 % (ref 36–47)
HGB BLD-MCNC: 8.3 G/DL (ref 12–15.5)
LYMPHOCYTES # BLD: 1.3 X10^3/UL (ref 1–4.8)
LYMPHOCYTES NFR BLD AUTO: 17 % (ref 24–48)
MCH RBC QN AUTO: 31 PG (ref 25–35)
MCHC RBC AUTO-ENTMCNC: 32 G/DL (ref 31–37)
MCV RBC AUTO: 96 FL (ref 79–100)
MONO #: 0.5 X10^3/UL (ref 0–1.1)
MONOCYTES NFR BLD: 7 % (ref 0–9)
NEUT #: 5.5 X10^3UL (ref 1.8–7.7)
NEUTROPHILS NFR BLD AUTO: 73 % (ref 31–73)
PLATELET # BLD AUTO: 192 X10^3/UL (ref 140–400)
POTASSIUM SERPL-SCNC: 4 MMOL/L (ref 3.5–5.1)
PROT SERPL-MCNC: 5.1 G/DL (ref 6.4–8.2)
RBC # BLD AUTO: 2.68 X10^6/UL (ref 3.5–5.4)
SODIUM SERPL-SCNC: 137 MMOL/L (ref 136–145)
WBC # BLD AUTO: 7.5 X10^3/UL (ref 4–11)

## 2020-05-01 RX ADMIN — CHOLESTYRAMINE SCH GM: 4 POWDER, FOR SUSPENSION ORAL at 21:00

## 2020-05-01 RX ADMIN — VANCOMYCIN HYDROCHLORIDE SCH MG: 500 INJECTION, POWDER, LYOPHILIZED, FOR SOLUTION INTRAVENOUS at 09:00

## 2020-05-01 RX ADMIN — PANTOPRAZOLE SODIUM SCH MG: 40 TABLET, DELAYED RELEASE ORAL at 10:02

## 2020-05-01 RX ADMIN — Medication SCH MCG: at 10:01

## 2020-05-01 RX ADMIN — ALLOPURINOL SCH MG: 100 TABLET ORAL at 10:02

## 2020-05-01 RX ADMIN — ASPIRIN 81 MG SCH MG: 81 TABLET ORAL at 10:01

## 2020-05-01 RX ADMIN — CHOLESTYRAMINE SCH GM: 4 POWDER, FOR SUSPENSION ORAL at 10:03

## 2020-05-01 RX ADMIN — SODIUM BICARBONATE SCH MG: 650 TABLET ORAL at 17:30

## 2020-05-01 RX ADMIN — VANCOMYCIN HYDROCHLORIDE SCH MG: 500 INJECTION, POWDER, LYOPHILIZED, FOR SOLUTION INTRAVENOUS at 13:33

## 2020-05-01 RX ADMIN — VANCOMYCIN HYDROCHLORIDE SCH MG: 500 INJECTION, POWDER, LYOPHILIZED, FOR SOLUTION INTRAVENOUS at 21:00

## 2020-05-01 RX ADMIN — GLIMEPIRIDE SCH MG: 2 TABLET ORAL at 10:01

## 2020-05-01 RX ADMIN — GABAPENTIN SCH MG: 100 CAPSULE ORAL at 21:00

## 2020-05-01 RX ADMIN — ATENOLOL SCH MG: 50 TABLET ORAL at 10:06

## 2020-05-01 RX ADMIN — SODIUM BICARBONATE SCH MG: 650 TABLET ORAL at 10:01

## 2020-05-01 RX ADMIN — Medication SCH CAP: at 10:02

## 2020-05-01 RX ADMIN — INSULIN LISPRO SCH UNITS: 100 INJECTION, SOLUTION INTRAVENOUS; SUBCUTANEOUS at 08:00

## 2020-05-01 RX ADMIN — SODIUM BICARBONATE SCH MG: 650 TABLET ORAL at 21:00

## 2020-05-01 RX ADMIN — INSULIN LISPRO SCH UNITS: 100 INJECTION, SOLUTION INTRAVENOUS; SUBCUTANEOUS at 13:35

## 2020-05-01 RX ADMIN — Medication SCH TAB: at 09:00

## 2020-05-01 RX ADMIN — INSULIN LISPRO SCH UNITS: 100 INJECTION, SOLUTION INTRAVENOUS; SUBCUTANEOUS at 16:53

## 2020-05-01 RX ADMIN — VANCOMYCIN HYDROCHLORIDE SCH MG: 500 INJECTION, POWDER, LYOPHILIZED, FOR SOLUTION INTRAVENOUS at 17:30

## 2020-05-01 NOTE — PN
DATE:  05/01/2020



ATTENDING PHYSICIAN:  Dr. Nguyen



CHIEF COMPLAINT:  Diarrhea.



SUBJECTIVE:  The patient is less dyspneic.  She still has significant pedal

edema.  She had a good diuresis.



OBJECTIVE FINDINGS:

VITAL SIGNS:  Heart rate at rest is 110, sinus, appears to be a sinus

tachycardia.  Her blood pressure is 135/75, temperature 98.0, oxygen saturation

98% on room air.

HEENT:  Head is without trauma.  Pupils are reactive.  Sclerae nonicteric.

NECK:  Veins were not distended today.

LUNGS:  Minimal crackles at bases.

CARDIOVASCULAR:  Showed a tachycardic rhythm.  Distant heart tones.  No gallops.

 Peripheral pulses are palpable and full.

ABDOMEN:  Soft, scaphoid, nontender, no organomegaly.  Bowel sounds are

normoactive.

EXTREMITIES:  Show 2+ pitting edema, improved since yesterday.

NEUROLOGIC:  Focally intact.  Her frequency of bowel movements has decreased and

is starting to firm up.



ASSESSMENT:

1.  Persistent diarrhea, improved, due to Clostridium difficile pseudomembranous

enterocolitis.

2.  Asymptomatic tachycardia.

3.  Vascular congestion due to volume overload.

4.  Acute on chronic renal failure, stabilized.

5.  Essential hypertension.

6.  Type 2 diabetes.

7.  Hyponatremia, resolved.



PLAN:

1.  Lasix 100 mg IV in the morning only.  She had a good response yesterday.

2.  Add atenolol to control heart rate.

3.  Increase diet as tolerated.

4.  Continue vancomycin and Xifaxan.

5.  P.r.n. Lomotil.





______________________________

SHAAN NGUYEN MD



DR:  SHANIKA/kathy  JOB#:  [id number] / [id number]

DD:  05/01/2020 08:16  DT:  05/01/2020 09:07

## 2020-05-02 ENCOUNTER — HOSPITAL ENCOUNTER (INPATIENT)
Dept: HOSPITAL 63 - 1 SOUTH | Age: 75
LOS: 6 days | Discharge: HOME HEALTH | DRG: 371 | End: 2020-05-08
Attending: INTERNAL MEDICINE | Admitting: INTERNAL MEDICINE
Payer: MEDICARE

## 2020-05-02 VITALS — DIASTOLIC BLOOD PRESSURE: 59 MMHG | SYSTOLIC BLOOD PRESSURE: 98 MMHG

## 2020-05-02 VITALS — BODY MASS INDEX: 31.7 KG/M2 | HEIGHT: 65 IN | WEIGHT: 190.26 LBS

## 2020-05-02 VITALS — SYSTOLIC BLOOD PRESSURE: 103 MMHG | DIASTOLIC BLOOD PRESSURE: 61 MMHG

## 2020-05-02 DIAGNOSIS — Z79.4: ICD-10-CM

## 2020-05-02 DIAGNOSIS — N18.9: ICD-10-CM

## 2020-05-02 DIAGNOSIS — E87.2: ICD-10-CM

## 2020-05-02 DIAGNOSIS — N17.9: ICD-10-CM

## 2020-05-02 DIAGNOSIS — E87.70: ICD-10-CM

## 2020-05-02 DIAGNOSIS — M10.9: ICD-10-CM

## 2020-05-02 DIAGNOSIS — I12.9: ICD-10-CM

## 2020-05-02 DIAGNOSIS — E87.1: ICD-10-CM

## 2020-05-02 DIAGNOSIS — E11.43: ICD-10-CM

## 2020-05-02 DIAGNOSIS — E11.22: ICD-10-CM

## 2020-05-02 DIAGNOSIS — E78.5: ICD-10-CM

## 2020-05-02 DIAGNOSIS — D64.9: ICD-10-CM

## 2020-05-02 DIAGNOSIS — E43: ICD-10-CM

## 2020-05-02 DIAGNOSIS — A04.72: Primary | ICD-10-CM

## 2020-05-02 PROCEDURE — 82947 ASSAY GLUCOSE BLOOD QUANT: CPT

## 2020-05-02 PROCEDURE — 93970 EXTREMITY STUDY: CPT

## 2020-05-02 PROCEDURE — 85027 COMPLETE CBC AUTOMATED: CPT

## 2020-05-02 PROCEDURE — 80053 COMPREHEN METABOLIC PANEL: CPT

## 2020-05-02 PROCEDURE — 36415 COLL VENOUS BLD VENIPUNCTURE: CPT

## 2020-05-02 PROCEDURE — 80048 BASIC METABOLIC PNL TOTAL CA: CPT

## 2020-05-02 RX ADMIN — ASPIRIN 81 MG SCH MG: 81 TABLET ORAL at 08:44

## 2020-05-02 RX ADMIN — SODIUM BICARBONATE SCH MG: 650 TABLET ORAL at 13:36

## 2020-05-02 RX ADMIN — Medication SCH TAB: at 08:58

## 2020-05-02 RX ADMIN — ONDANSETRON SCH MG: 4 TABLET, ORALLY DISINTEGRATING ORAL at 22:00

## 2020-05-02 RX ADMIN — ATENOLOL SCH MG: 50 TABLET ORAL at 08:58

## 2020-05-02 RX ADMIN — PANTOPRAZOLE SODIUM SCH MG: 40 TABLET, DELAYED RELEASE ORAL at 08:46

## 2020-05-02 RX ADMIN — VANCOMYCIN HYDROCHLORIDE SCH MG: 500 INJECTION, POWDER, LYOPHILIZED, FOR SOLUTION INTRAVENOUS at 13:36

## 2020-05-02 RX ADMIN — Medication SCH CAP: at 08:44

## 2020-05-02 RX ADMIN — Medication SCH MCG: at 08:44

## 2020-05-02 RX ADMIN — VANCOMYCIN HYDROCHLORIDE SCH MG: 500 INJECTION, POWDER, LYOPHILIZED, FOR SOLUTION INTRAVENOUS at 21:00

## 2020-05-02 RX ADMIN — GABAPENTIN SCH MG: 100 CAPSULE ORAL at 21:00

## 2020-05-02 RX ADMIN — INSULIN LISPRO SCH UNITS: 100 INJECTION, SOLUTION INTRAVENOUS; SUBCUTANEOUS at 12:15

## 2020-05-02 RX ADMIN — ALLOPURINOL SCH MG: 100 TABLET ORAL at 08:45

## 2020-05-02 RX ADMIN — CHOLESTYRAMINE SCH GM: 4 POWDER, FOR SUSPENSION ORAL at 21:00

## 2020-05-02 RX ADMIN — VANCOMYCIN HYDROCHLORIDE SCH MG: 500 INJECTION, POWDER, LYOPHILIZED, FOR SOLUTION INTRAVENOUS at 08:50

## 2020-05-02 RX ADMIN — INSULIN LISPRO SCH UNITS: 100 INJECTION, SOLUTION INTRAVENOUS; SUBCUTANEOUS at 08:00

## 2020-05-02 RX ADMIN — SODIUM BICARBONATE SCH MG: 650 TABLET ORAL at 08:45

## 2020-05-02 RX ADMIN — CHOLESTYRAMINE SCH GM: 4 POWDER, FOR SUSPENSION ORAL at 08:46

## 2020-05-02 RX ADMIN — VANCOMYCIN HYDROCHLORIDE SCH MG: 500 INJECTION, POWDER, LYOPHILIZED, FOR SOLUTION INTRAVENOUS at 17:00

## 2020-05-02 RX ADMIN — INSULIN LISPRO SCH UNITS: 100 INJECTION, SOLUTION INTRAVENOUS; SUBCUTANEOUS at 16:57

## 2020-05-02 RX ADMIN — GLIMEPIRIDE SCH MG: 2 TABLET ORAL at 08:45

## 2020-05-02 NOTE — NUR
Patient sitting in chair. Patient Kent removed. patient has no further needs at this time. 
Patient had 4 loose stools today.

## 2020-05-02 NOTE — NUR
NSG NOTE; DISCHARGE TO SKILLED NURSING UNIT



PER DR NGUYEN'S ORDERS AND PT'S CONSENT, PT IS BEING DISCHARGED FROM IN PATIENT ACUTE STATUS TO 
BEGIN A STAY IN THE SKILLED NURSING UNIT.

## 2020-05-02 NOTE — DS
DATE OF DISCHARGE:  05/02/2020



ATTENDING PHYSICIANS:  Dr. Ghosh and Dr. Nguyen.



FINAL DISCHARGE DIAGNOSES:

1.  Clostridium difficile pseudomembranous enterocolitis.

2.  Asymptomatic tachycardia, resolved.

3.  Vascular congestion secondary to volume overload.

4.  Acute-on-chronic renal failure, stabilized.

5.  Essential hypertension.

6.  Type 2 diabetes.

7.  Hyponatremia, resolved.



HISTORY OF PRESENT ILLNESS:  The patient is a 74-year-old female, fairly active.

 She has had several rounds of antibiotics for sinusitis by her primary care

physician.  She has had 2-week history of loose stools.  She had a stool

specimen, which came back positive for the Clostridium difficile toxin.  She was

admitted for further treatment and evaluation.



PHYSICAL EXAMINATION:  Please see the dictated note.



PERTINENT LABORATORY AND X-RAY STUDIES:  Creatinine peaked at a level of 3.7

mg/dL; prior to discharge, it was down to 2.3 mg/dL.  This is about her

baseline.  Sodium was 137 mEq, potassium 4.0 mEq per liter.  Hemoglobin

maintained at 9.2 g/dL with a white count down to 7500.  Urinalysis is

unremarkable.  Followup chest x-ray on the seventh hospital day showed heart

size upper limits, vascular congestion, no acute infiltrates identified.



COURSE IN THE HOSPITAL:  The patient was admitted.  She was given IV hydration. 

She did fairly well.  After about 7 days, when I saw her, she had a little

shortness of breath.  Her swelling of the ankles was noted.  She had congestion

on the x-ray.  IVs were discontinued and she responded well to Lasix.  She

received a total of 10 full days of the oral vancomycin 4 times a day,

persistent diarrhea, was also helped with the addition of Xifaxan 550 b.i.d. and

Lomotil p.r.n.  She was doing better and breathing well, still remained quite

weak.  She is agreeable to go to the rehab unit; therefore on the 10th hospital

day, arrangements were made for the patient to be discharged and be readmitted

on the rehab unit.



DISCHARGE MEDICATIONS:  Will include continuation of her vancomycin 125 p.o.

q.i.d., Tylenol, allopurinol, aspirin, colchicine, estrogens, Nexium, Neurontin,

glyburide, iron, losartan and atenolol 50 mg p.o. daily.



She will follow a diabetic diet.  Her prognosis is fair.  She was discharged

then from our hospital in stable condition to be continued care on the rehab

unit.



Total discharge time is 39 minutes.





______________________________

SHAAN NGUYEN MD



DR:  SHANIKA/kathy  JOB#:  [id number] / [id number]

DD:  05/02/2020 09:42  DT:  05/02/2020 10:00



ALICIA Pleitez MD

## 2020-05-03 VITALS — SYSTOLIC BLOOD PRESSURE: 121 MMHG | DIASTOLIC BLOOD PRESSURE: 69 MMHG

## 2020-05-03 VITALS — DIASTOLIC BLOOD PRESSURE: 63 MMHG | SYSTOLIC BLOOD PRESSURE: 104 MMHG

## 2020-05-03 VITALS — DIASTOLIC BLOOD PRESSURE: 65 MMHG | SYSTOLIC BLOOD PRESSURE: 112 MMHG

## 2020-05-03 RX ADMIN — INSULIN LISPRO SCH UNITS: 100 INJECTION, SOLUTION INTRAVENOUS; SUBCUTANEOUS at 16:46

## 2020-05-03 RX ADMIN — Medication SCH TAB: at 08:11

## 2020-05-03 RX ADMIN — ONDANSETRON SCH MG: 4 TABLET, ORALLY DISINTEGRATING ORAL at 06:00

## 2020-05-03 RX ADMIN — VANCOMYCIN HYDROCHLORIDE SCH MG: 500 INJECTION, POWDER, LYOPHILIZED, FOR SOLUTION INTRAVENOUS at 08:11

## 2020-05-03 RX ADMIN — COLCHICINE SCH MG: 0.6 TABLET, FILM COATED ORAL at 08:10

## 2020-05-03 RX ADMIN — ONDANSETRON SCH MG: 4 TABLET, ORALLY DISINTEGRATING ORAL at 22:00

## 2020-05-03 RX ADMIN — INSULIN LISPRO SCH UNITS: 100 INJECTION, SOLUTION INTRAVENOUS; SUBCUTANEOUS at 12:00

## 2020-05-03 RX ADMIN — CHOLESTYRAMINE SCH GM: 4 POWDER, FOR SUSPENSION ORAL at 20:12

## 2020-05-03 RX ADMIN — Medication SCH MCG: at 08:08

## 2020-05-03 RX ADMIN — VANCOMYCIN HYDROCHLORIDE SCH MG: 500 INJECTION, POWDER, LYOPHILIZED, FOR SOLUTION INTRAVENOUS at 16:51

## 2020-05-03 RX ADMIN — CHOLESTYRAMINE SCH GM: 4 POWDER, FOR SUSPENSION ORAL at 08:10

## 2020-05-03 RX ADMIN — PANTOPRAZOLE SODIUM SCH MG: 40 TABLET, DELAYED RELEASE ORAL at 08:11

## 2020-05-03 RX ADMIN — GLIMEPIRIDE SCH MG: 2 TABLET ORAL at 08:09

## 2020-05-03 RX ADMIN — GABAPENTIN SCH MG: 100 CAPSULE ORAL at 20:11

## 2020-05-03 RX ADMIN — ALLOPURINOL SCH MG: 300 TABLET ORAL at 08:09

## 2020-05-03 RX ADMIN — ONDANSETRON SCH MG: 4 TABLET, ORALLY DISINTEGRATING ORAL at 12:00

## 2020-05-03 RX ADMIN — ASPIRIN 81 MG SCH MG: 81 TABLET ORAL at 08:09

## 2020-05-03 RX ADMIN — VANCOMYCIN HYDROCHLORIDE SCH MG: 500 INJECTION, POWDER, LYOPHILIZED, FOR SOLUTION INTRAVENOUS at 12:00

## 2020-05-03 RX ADMIN — ATENOLOL SCH MG: 50 TABLET ORAL at 08:09

## 2020-05-03 RX ADMIN — VANCOMYCIN HYDROCHLORIDE SCH MG: 500 INJECTION, POWDER, LYOPHILIZED, FOR SOLUTION INTRAVENOUS at 20:12

## 2020-05-03 RX ADMIN — LOSARTAN POTASSIUM SCH MG: 25 TABLET, FILM COATED ORAL at 08:09

## 2020-05-03 RX ADMIN — Medication SCH CAP: at 08:09

## 2020-05-03 RX ADMIN — INSULIN LISPRO SCH UNITS: 100 INJECTION, SOLUTION INTRAVENOUS; SUBCUTANEOUS at 08:00

## 2020-05-03 NOTE — PN
DATE:  05/03/2020



The patient was discharged yesterday and now on rehab status.



SUBJECTIVE:  Doing well.  She came up and ambulating.  Stools are becoming

formed.  There is no further diarrhea.  Her appetite still is not 100%.



OBJECTIVE FINDINGS:

VITAL SIGNS:  Her blood pressure today is 121/69, her pulse is 77 and regular,

oxygen saturation 100% on room air, temperature 98.0 degrees Fahrenheit.

HEENT:  Within normal limits.

NECK:  Supple.  Oropharynx is clear.

LUNGS:  Clear.

CARDIOVASCULAR:  Showed regular heart tones.

ABDOMEN:  Soft, nontender, no organomegaly.  Bowel sounds are hypoactive.

EXTREMITIES:  Showed trace edema.

NEUROLOGIC:  Focally intact.



LABORATORY STUDIES:  Creatinine was 2.3 mg ____ on Friday.



ASSESSMENT:

1.  A 74-year-old female with Clostridium difficile colitis.

2.  She had volume overload from IV hydration, now improved with 3 days of

diuretics.

3.  Essential hypertension.

4.  Tachycardia, resolved.

5.  Essential hypertension.

6.  Acute-on-chronic kidney injury.  Baseline creatinine is probably somewhere

around 2.0 mg/dL.



PLAN:

1.  Continue her oral vancomycin and Xifaxan.

2.  Followup chemistry panel in the morning.

3.  Continue home meds.

4.  Glucose control.





______________________________

SHAAN NGUYEN MD



DR:  SHANIKA/kathy  JOB#:  [id number] / [id number]

DD:  05/03/2020 10:38  DT:  05/03/2020 12:05

## 2020-05-04 VITALS — DIASTOLIC BLOOD PRESSURE: 59 MMHG | SYSTOLIC BLOOD PRESSURE: 107 MMHG

## 2020-05-04 VITALS — DIASTOLIC BLOOD PRESSURE: 73 MMHG | SYSTOLIC BLOOD PRESSURE: 99 MMHG

## 2020-05-04 LAB
ANION GAP SERPL CALC-SCNC: 9 MMOL/L (ref 6–14)
CA-I SERPL ISE-MCNC: 34 MG/DL (ref 7–20)
CALCIUM SERPL-MCNC: 8.5 MG/DL (ref 8.5–10.1)
CHLORIDE SERPL-SCNC: 109 MMOL/L (ref 98–107)
CO2 SERPL-SCNC: 21 MMOL/L (ref 21–32)
CREAT SERPL-MCNC: 2.4 MG/DL (ref 0.6–1)
GFR SERPLBLD BASED ON 1.73 SQ M-ARVRAT: 19.8 ML/MIN
GLUCOSE SERPL-MCNC: 134 MG/DL (ref 70–99)
POTASSIUM SERPL-SCNC: 3.8 MMOL/L (ref 3.5–5.1)
SODIUM SERPL-SCNC: 139 MMOL/L (ref 136–145)

## 2020-05-04 RX ADMIN — GLIMEPIRIDE SCH MG: 2 TABLET ORAL at 07:47

## 2020-05-04 RX ADMIN — CHOLESTYRAMINE SCH GM: 4 POWDER, FOR SUSPENSION ORAL at 07:48

## 2020-05-04 RX ADMIN — ONDANSETRON SCH MG: 4 TABLET, ORALLY DISINTEGRATING ORAL at 06:00

## 2020-05-04 RX ADMIN — Medication SCH MCG: at 07:48

## 2020-05-04 RX ADMIN — VANCOMYCIN HYDROCHLORIDE SCH MG: 500 INJECTION, POWDER, LYOPHILIZED, FOR SOLUTION INTRAVENOUS at 11:47

## 2020-05-04 RX ADMIN — PANTOPRAZOLE SODIUM SCH MG: 40 TABLET, DELAYED RELEASE ORAL at 07:48

## 2020-05-04 RX ADMIN — ALLOPURINOL SCH MG: 300 TABLET ORAL at 07:47

## 2020-05-04 RX ADMIN — VANCOMYCIN HYDROCHLORIDE SCH MG: 500 INJECTION, POWDER, LYOPHILIZED, FOR SOLUTION INTRAVENOUS at 07:57

## 2020-05-04 RX ADMIN — ATENOLOL SCH MG: 50 TABLET ORAL at 07:47

## 2020-05-04 RX ADMIN — INSULIN LISPRO SCH UNITS: 100 INJECTION, SOLUTION INTRAVENOUS; SUBCUTANEOUS at 11:48

## 2020-05-04 RX ADMIN — Medication SCH TAB: at 07:54

## 2020-05-04 RX ADMIN — VANCOMYCIN HYDROCHLORIDE SCH MG: 500 INJECTION, POWDER, LYOPHILIZED, FOR SOLUTION INTRAVENOUS at 21:25

## 2020-05-04 RX ADMIN — INSULIN LISPRO SCH UNITS: 100 INJECTION, SOLUTION INTRAVENOUS; SUBCUTANEOUS at 07:54

## 2020-05-04 RX ADMIN — ASPIRIN 81 MG SCH MG: 81 TABLET ORAL at 07:47

## 2020-05-04 RX ADMIN — Medication SCH CAP: at 07:47

## 2020-05-04 RX ADMIN — ONDANSETRON SCH MG: 4 TABLET, ORALLY DISINTEGRATING ORAL at 21:25

## 2020-05-04 RX ADMIN — COLCHICINE SCH MG: 0.6 TABLET, FILM COATED ORAL at 07:49

## 2020-05-04 RX ADMIN — LOSARTAN POTASSIUM SCH MG: 25 TABLET, FILM COATED ORAL at 07:48

## 2020-05-04 RX ADMIN — INSULIN LISPRO SCH UNITS: 100 INJECTION, SOLUTION INTRAVENOUS; SUBCUTANEOUS at 16:52

## 2020-05-04 RX ADMIN — VANCOMYCIN HYDROCHLORIDE SCH MG: 500 INJECTION, POWDER, LYOPHILIZED, FOR SOLUTION INTRAVENOUS at 16:52

## 2020-05-04 RX ADMIN — ONDANSETRON SCH MG: 4 TABLET, ORALLY DISINTEGRATING ORAL at 12:08

## 2020-05-04 RX ADMIN — GABAPENTIN SCH MG: 100 CAPSULE ORAL at 21:25

## 2020-05-04 RX ADMIN — CHOLESTYRAMINE SCH GM: 4 POWDER, FOR SUSPENSION ORAL at 21:25

## 2020-05-04 NOTE — PN
DATE:  05/04/2020



REHAB UNIT PROGRESS NOTE



SUBJECTIVE:  No new complaints.  She is doing well.  She is up and ambulating

with some minimal assistance.  She denied any further loose stools or diarrhea. 

Her appetite is improving and she is getting a bit stronger.



OBJECTIVE FINDINGS:

VITAL SIGNS:  Today, her repeat electrolytes were within range.  Her creatinine

has settled down and has been unchanged at 2.4 mg/dL, which is close to

baseline.  Her blood pressure is 109/59.  Her temperature is 97.8 degrees

Fahrenheit and her oxygen saturation is 97% on room air, heart rate is 75 and

regular.

HEENT:  Head is without trauma.  Pupils are reactive.  Sclerae nonicteric. 

Oropharynx clear.

NECK:  Supple, no bruits.

LUNGS:  Clear.

CARDIOVASCULAR:  Showed regular heart tones.

ABDOMEN:  Soft, nontender.

EXTREMITIES:  Show still trace edema.

NEUROLOGIC:  Focally intact.  Gait was normal and speech was fluent.



ASSESSMENT:

1.  A 74-year-old female with Clostridium difficile enteral colitis, improved.

2.  Volume overload from IV hydration, improved with 3 days of diuresis.

3.  Essential hypertension.

4.  Tachycardia, improved with beta blockade.

5.  Acute on chronic kidney injury with baseline creatinine close to baseline

for her.

6.  Diabetes.



PLAN:

1.  We can stop the Xifaxan.

2.  Continue vancomycin for few more days.

3.  Follow up chemistry weekly.

4.  Physical therapy to help.

5.  Discharge planning to go home.





______________________________

SHAAN NGUYEN MD



DR:  SHANIKA/kathy  JOB#:  [id number] / [id number]

DD:  05/04/2020 09:22  DT:  05/04/2020 09:50

## 2020-05-05 VITALS — SYSTOLIC BLOOD PRESSURE: 108 MMHG | DIASTOLIC BLOOD PRESSURE: 66 MMHG

## 2020-05-05 VITALS — SYSTOLIC BLOOD PRESSURE: 95 MMHG | DIASTOLIC BLOOD PRESSURE: 51 MMHG

## 2020-05-05 RX ADMIN — ONDANSETRON SCH MG: 4 TABLET, ORALLY DISINTEGRATING ORAL at 05:37

## 2020-05-05 RX ADMIN — VANCOMYCIN HYDROCHLORIDE SCH MG: 500 INJECTION, POWDER, LYOPHILIZED, FOR SOLUTION INTRAVENOUS at 08:05

## 2020-05-05 RX ADMIN — ASPIRIN 81 MG SCH MG: 81 TABLET ORAL at 07:56

## 2020-05-05 RX ADMIN — INSULIN LISPRO SCH UNITS: 100 INJECTION, SOLUTION INTRAVENOUS; SUBCUTANEOUS at 07:57

## 2020-05-05 RX ADMIN — ALLOPURINOL SCH MG: 300 TABLET ORAL at 07:56

## 2020-05-05 RX ADMIN — ATENOLOL SCH MG: 50 TABLET ORAL at 07:59

## 2020-05-05 RX ADMIN — GABAPENTIN SCH MG: 100 CAPSULE ORAL at 21:43

## 2020-05-05 RX ADMIN — INSULIN LISPRO SCH UNITS: 100 INJECTION, SOLUTION INTRAVENOUS; SUBCUTANEOUS at 17:00

## 2020-05-05 RX ADMIN — Medication SCH MCG: at 07:56

## 2020-05-05 RX ADMIN — VANCOMYCIN HYDROCHLORIDE SCH MG: 500 INJECTION, POWDER, LYOPHILIZED, FOR SOLUTION INTRAVENOUS at 12:42

## 2020-05-05 RX ADMIN — PANTOPRAZOLE SODIUM SCH MG: 40 TABLET, DELAYED RELEASE ORAL at 07:56

## 2020-05-05 RX ADMIN — Medication SCH TAB: at 07:57

## 2020-05-05 RX ADMIN — LOSARTAN POTASSIUM SCH MG: 25 TABLET, FILM COATED ORAL at 07:58

## 2020-05-05 RX ADMIN — INSULIN LISPRO SCH UNITS: 100 INJECTION, SOLUTION INTRAVENOUS; SUBCUTANEOUS at 12:00

## 2020-05-05 RX ADMIN — VANCOMYCIN HYDROCHLORIDE SCH MG: 500 INJECTION, POWDER, LYOPHILIZED, FOR SOLUTION INTRAVENOUS at 17:01

## 2020-05-05 RX ADMIN — CHOLESTYRAMINE SCH GM: 4 POWDER, FOR SUSPENSION ORAL at 07:59

## 2020-05-05 RX ADMIN — GLIMEPIRIDE SCH MG: 2 TABLET ORAL at 07:57

## 2020-05-05 RX ADMIN — COLCHICINE SCH MG: 0.6 TABLET, FILM COATED ORAL at 07:58

## 2020-05-05 RX ADMIN — CHOLESTYRAMINE SCH GM: 4 POWDER, FOR SUSPENSION ORAL at 21:00

## 2020-05-05 RX ADMIN — Medication SCH CAP: at 07:56

## 2020-05-05 RX ADMIN — VANCOMYCIN HYDROCHLORIDE SCH MG: 500 INJECTION, POWDER, LYOPHILIZED, FOR SOLUTION INTRAVENOUS at 21:00

## 2020-05-05 NOTE — PN
DATE:  05/05/2020



ATTENDING PHYSICIAN:  Dr. Nguyen.



SUBJECTIVE:  Doing well, still weak on legs.  She is ambulating.  Bowels have

formed.  She wants us to stop the powder Questran, which I think is reasonable.



OBJECTIVE FINDINGS:

GENERAL:  Her stools are formed now.

VITAL SIGNS:  Her blood pressure is 108/66, her pulse is 68 and regular,

temperature 97.7 degrees Fahrenheit, oxygen saturation 100% on room air.

HEENT:  Head is without trauma.  Pupils are reactive.  Sclerae nonicteric. 

Oropharynx clear.

NECK:  Supple.

LUNGS:  Clear.

CARDIOVASCULAR:  Showed regular heart tones.  No gallops.

ABDOMEN:  Soft.

EXTREMITIES:  Still showed 1-2+ edema of the ankles.



ASSESSMENT:

1.  A 74-year-old female with finishing her course of vancomycin for Clostridium

difficile pseudomembranous colitis.

2.  Sinus tachycardia, improved with beta-blocker.

3.  Essential hypertension.

4.  Acute on chronic kidney failure.  Creatinine has stabilized at 2.4 mg/dL,

which is about her baseline.

5.  Type 2 diabetes.



PLAN:

1.  We stopped the Xifaxan yesterday.

2.  We will continue the vancomycin for a few more days.

3.  We can stop the Questran.

4.  Continue PT, OT.

5.  Discharge planning for later this week.  Dr. Ghosh will be back tomorrow.





______________________________

SHAAN NGUYEN MD



DR:  SHANIKA/kathy  JOB#:  [id number] / [id number]

DD:  05/05/2020 09:31  DT:  05/05/2020 09:39

## 2020-05-06 VITALS — DIASTOLIC BLOOD PRESSURE: 65 MMHG | SYSTOLIC BLOOD PRESSURE: 104 MMHG

## 2020-05-06 VITALS — DIASTOLIC BLOOD PRESSURE: 61 MMHG | SYSTOLIC BLOOD PRESSURE: 100 MMHG

## 2020-05-06 RX ADMIN — VANCOMYCIN HYDROCHLORIDE SCH MG: 500 INJECTION, POWDER, LYOPHILIZED, FOR SOLUTION INTRAVENOUS at 12:27

## 2020-05-06 RX ADMIN — PANTOPRAZOLE SODIUM SCH MG: 40 TABLET, DELAYED RELEASE ORAL at 08:09

## 2020-05-06 RX ADMIN — VANCOMYCIN HYDROCHLORIDE SCH MG: 500 INJECTION, POWDER, LYOPHILIZED, FOR SOLUTION INTRAVENOUS at 08:09

## 2020-05-06 RX ADMIN — ATENOLOL SCH MG: 50 TABLET ORAL at 08:10

## 2020-05-06 RX ADMIN — INSULIN LISPRO SCH UNITS: 100 INJECTION, SOLUTION INTRAVENOUS; SUBCUTANEOUS at 11:55

## 2020-05-06 RX ADMIN — COLCHICINE SCH MG: 0.6 TABLET, FILM COATED ORAL at 08:12

## 2020-05-06 RX ADMIN — CHOLESTYRAMINE SCH GM: 4 POWDER, FOR SUSPENSION ORAL at 08:13

## 2020-05-06 RX ADMIN — ASPIRIN 81 MG SCH MG: 81 TABLET ORAL at 08:09

## 2020-05-06 RX ADMIN — VANCOMYCIN HYDROCHLORIDE SCH MG: 500 INJECTION, POWDER, LYOPHILIZED, FOR SOLUTION INTRAVENOUS at 17:03

## 2020-05-06 RX ADMIN — LOSARTAN POTASSIUM SCH MG: 25 TABLET, FILM COATED ORAL at 08:09

## 2020-05-06 RX ADMIN — GABAPENTIN SCH MG: 100 CAPSULE ORAL at 21:16

## 2020-05-06 RX ADMIN — Medication SCH MCG: at 08:08

## 2020-05-06 RX ADMIN — ALLOPURINOL SCH MG: 300 TABLET ORAL at 08:09

## 2020-05-06 RX ADMIN — CHOLESTYRAMINE SCH GM: 4 POWDER, FOR SUSPENSION ORAL at 21:00

## 2020-05-06 RX ADMIN — VANCOMYCIN HYDROCHLORIDE SCH MG: 500 INJECTION, POWDER, LYOPHILIZED, FOR SOLUTION INTRAVENOUS at 21:16

## 2020-05-06 RX ADMIN — GLIMEPIRIDE SCH MG: 2 TABLET ORAL at 08:09

## 2020-05-06 RX ADMIN — Medication SCH CAP: at 08:09

## 2020-05-06 RX ADMIN — Medication SCH TAB: at 08:13

## 2020-05-06 RX ADMIN — INSULIN LISPRO SCH UNITS: 100 INJECTION, SOLUTION INTRAVENOUS; SUBCUTANEOUS at 08:00

## 2020-05-06 RX ADMIN — INSULIN LISPRO SCH UNITS: 100 INJECTION, SOLUTION INTRAVENOUS; SUBCUTANEOUS at 16:55

## 2020-05-07 VITALS — DIASTOLIC BLOOD PRESSURE: 67 MMHG | SYSTOLIC BLOOD PRESSURE: 107 MMHG

## 2020-05-07 VITALS — DIASTOLIC BLOOD PRESSURE: 66 MMHG | SYSTOLIC BLOOD PRESSURE: 98 MMHG

## 2020-05-07 VITALS — DIASTOLIC BLOOD PRESSURE: 59 MMHG | SYSTOLIC BLOOD PRESSURE: 107 MMHG

## 2020-05-07 RX ADMIN — VANCOMYCIN HYDROCHLORIDE SCH MG: 500 INJECTION, POWDER, LYOPHILIZED, FOR SOLUTION INTRAVENOUS at 21:29

## 2020-05-07 RX ADMIN — CHOLESTYRAMINE SCH GM: 4 POWDER, FOR SUSPENSION ORAL at 21:29

## 2020-05-07 RX ADMIN — Medication SCH CAP: at 08:35

## 2020-05-07 RX ADMIN — ATENOLOL SCH MG: 50 TABLET ORAL at 09:00

## 2020-05-07 RX ADMIN — LOSARTAN POTASSIUM SCH MG: 25 TABLET, FILM COATED ORAL at 09:00

## 2020-05-07 RX ADMIN — CHOLESTYRAMINE SCH GM: 4 POWDER, FOR SUSPENSION ORAL at 09:00

## 2020-05-07 RX ADMIN — ALLOPURINOL SCH MG: 300 TABLET ORAL at 08:35

## 2020-05-07 RX ADMIN — COLCHICINE SCH MG: 0.6 TABLET, FILM COATED ORAL at 08:36

## 2020-05-07 RX ADMIN — INSULIN LISPRO SCH UNITS: 100 INJECTION, SOLUTION INTRAVENOUS; SUBCUTANEOUS at 08:00

## 2020-05-07 RX ADMIN — Medication SCH MCG: at 08:36

## 2020-05-07 RX ADMIN — VANCOMYCIN HYDROCHLORIDE SCH MG: 500 INJECTION, POWDER, LYOPHILIZED, FOR SOLUTION INTRAVENOUS at 08:36

## 2020-05-07 RX ADMIN — PANTOPRAZOLE SODIUM SCH MG: 40 TABLET, DELAYED RELEASE ORAL at 08:35

## 2020-05-07 RX ADMIN — GLIMEPIRIDE SCH MG: 2 TABLET ORAL at 08:35

## 2020-05-07 RX ADMIN — GABAPENTIN SCH MG: 100 CAPSULE ORAL at 21:29

## 2020-05-07 RX ADMIN — INSULIN LISPRO SCH UNITS: 100 INJECTION, SOLUTION INTRAVENOUS; SUBCUTANEOUS at 12:09

## 2020-05-07 RX ADMIN — Medication SCH TAB: at 09:00

## 2020-05-07 RX ADMIN — ASPIRIN 81 MG SCH MG: 81 TABLET ORAL at 08:35

## 2020-05-07 RX ADMIN — VANCOMYCIN HYDROCHLORIDE SCH MG: 500 INJECTION, POWDER, LYOPHILIZED, FOR SOLUTION INTRAVENOUS at 13:23

## 2020-05-07 RX ADMIN — INSULIN LISPRO SCH UNITS: 100 INJECTION, SOLUTION INTRAVENOUS; SUBCUTANEOUS at 16:55

## 2020-05-07 NOTE — RAD
EXAM: Bilateral lower extremity venous Doppler sonogram.

 

HISTORY: Pain and swelling.

 

TECHNIQUE: Gray scale and color Doppler sonographic evaluation of the 

bilateral lower extremity veins with spectral waveform analysis was 

performed.

 

FINDINGS: There is normal color flow, normal compressibility and there are

normal spectral waveforms in the lower extremity veins. The calf veins are

not well seen due to soft tissue edema.

 

IMPRESSION: No Doppler evidence of lower extremity deep venous thrombosis,

with limited evaluation of the calf veins due to soft tissue edema.

 

Electronically signed by: Robyn Eastman MD (5/7/2020 4:19 PM) [id number]

## 2020-05-08 VITALS
DIASTOLIC BLOOD PRESSURE: 67 MMHG | SYSTOLIC BLOOD PRESSURE: 113 MMHG | DIASTOLIC BLOOD PRESSURE: 67 MMHG | SYSTOLIC BLOOD PRESSURE: 113 MMHG | SYSTOLIC BLOOD PRESSURE: 113 MMHG | SYSTOLIC BLOOD PRESSURE: 113 MMHG | DIASTOLIC BLOOD PRESSURE: 67 MMHG | DIASTOLIC BLOOD PRESSURE: 67 MMHG | DIASTOLIC BLOOD PRESSURE: 67 MMHG | DIASTOLIC BLOOD PRESSURE: 67 MMHG | SYSTOLIC BLOOD PRESSURE: 113 MMHG | SYSTOLIC BLOOD PRESSURE: 113 MMHG | DIASTOLIC BLOOD PRESSURE: 67 MMHG | SYSTOLIC BLOOD PRESSURE: 113 MMHG

## 2020-05-08 VITALS — SYSTOLIC BLOOD PRESSURE: 113 MMHG | DIASTOLIC BLOOD PRESSURE: 67 MMHG

## 2020-05-08 LAB
ALBUMIN SERPL-MCNC: 1.8 G/DL (ref 3.4–5)
ALBUMIN/GLOB SERPL: 0.6 {RATIO} (ref 1–1.7)
ALP SERPL-CCNC: 82 U/L (ref 46–116)
ALT SERPL-CCNC: 33 U/L (ref 14–59)
ANION GAP SERPL CALC-SCNC: 10 MMOL/L (ref 6–14)
ANISOCYTOSIS BLD QL SMEAR: (no result)
AST SERPL-CCNC: 26 U/L (ref 15–37)
BILIRUB SERPL-MCNC: 0.4 MG/DL (ref 0.2–1)
BUN/CREAT SERPL: 15 (ref 6–20)
CA-I SERPL ISE-MCNC: 36 MG/DL (ref 7–20)
CALCIUM SERPL-MCNC: 8.4 MG/DL (ref 8.5–10.1)
CHLORIDE SERPL-SCNC: 112 MMOL/L (ref 98–107)
CO2 SERPL-SCNC: 20 MMOL/L (ref 21–32)
CREAT SERPL-MCNC: 2.4 MG/DL (ref 0.6–1)
DACRYOCYTES BLD QL SMEAR: (no result)
ERYTHROCYTE [DISTWIDTH] IN BLOOD BY AUTOMATED COUNT: 20.2 % (ref 11.5–14.5)
GFR SERPLBLD BASED ON 1.73 SQ M-ARVRAT: 19.8 ML/MIN
GLOBULIN SER-MCNC: 3.1 G/DL (ref 2.2–3.8)
GLUCOSE SERPL-MCNC: 108 MG/DL (ref 70–99)
HCT VFR BLD CALC: 24.4 % (ref 36–47)
HGB BLD-MCNC: 7.9 G/DL (ref 12–15.5)
MCH RBC QN AUTO: 32 PG (ref 25–35)
MCHC RBC AUTO-ENTMCNC: 33 G/DL (ref 31–37)
MCV RBC AUTO: 98 FL (ref 79–100)
OVALOCYTES BLD QL SMEAR: (no result)
PLATELET # BLD AUTO: 141 X10^3/UL (ref 140–400)
PLATELET # BLD EST: ADEQUATE 10*3/UL
POTASSIUM SERPL-SCNC: 3.9 MMOL/L (ref 3.5–5.1)
PROT SERPL-MCNC: 4.9 G/DL (ref 6.4–8.2)
RBC # BLD AUTO: 2.48 X10^6/UL (ref 3.5–5.4)
SODIUM SERPL-SCNC: 142 MMOL/L (ref 136–145)
WBC # BLD AUTO: 5.4 X10^3/UL (ref 4–11)

## 2020-05-08 RX ADMIN — ATENOLOL SCH MG: 50 TABLET ORAL at 08:50

## 2020-05-08 RX ADMIN — Medication SCH CAP: at 08:49

## 2020-05-08 RX ADMIN — COLCHICINE SCH MG: 0.6 TABLET, FILM COATED ORAL at 08:50

## 2020-05-08 RX ADMIN — ALLOPURINOL SCH MG: 300 TABLET ORAL at 08:49

## 2020-05-08 RX ADMIN — INSULIN LISPRO SCH UNITS: 100 INJECTION, SOLUTION INTRAVENOUS; SUBCUTANEOUS at 08:00

## 2020-05-08 RX ADMIN — VANCOMYCIN HYDROCHLORIDE SCH MG: 500 INJECTION, POWDER, LYOPHILIZED, FOR SOLUTION INTRAVENOUS at 14:00

## 2020-05-08 RX ADMIN — VANCOMYCIN HYDROCHLORIDE SCH MG: 500 INJECTION, POWDER, LYOPHILIZED, FOR SOLUTION INTRAVENOUS at 08:50

## 2020-05-08 RX ADMIN — INSULIN LISPRO SCH UNITS: 100 INJECTION, SOLUTION INTRAVENOUS; SUBCUTANEOUS at 12:08

## 2020-05-08 RX ADMIN — LOSARTAN POTASSIUM SCH MG: 25 TABLET, FILM COATED ORAL at 09:00

## 2020-05-08 RX ADMIN — Medication SCH MCG: at 08:49

## 2020-05-08 RX ADMIN — PANTOPRAZOLE SODIUM SCH MG: 40 TABLET, DELAYED RELEASE ORAL at 08:50

## 2020-05-08 RX ADMIN — CHOLESTYRAMINE SCH GM: 4 POWDER, FOR SUSPENSION ORAL at 08:49

## 2020-05-08 RX ADMIN — GLIMEPIRIDE SCH MG: 2 TABLET ORAL at 08:49

## 2020-05-08 RX ADMIN — Medication SCH TAB: at 09:00

## 2020-05-08 RX ADMIN — ASPIRIN 81 MG SCH MG: 81 TABLET ORAL at 08:50

## 2020-05-08 NOTE — DS
DATE OF DISCHARGE:  05/08/2020



HOSPITAL COURSE:  The patient is a 74-year-old  female patient who was

originally admitted directly from her primary care physician with recurrent

bouts of diarrhea turned out to be positive for C. diff colitis.  She also has

acute on chronic kidney injury with creatinine originally was 3.8.  She was

treated with IV fluid and oral vancomycin and her kidney function has gradually

improved and plateaued around 2.4 from 1.9 on January 2020.  She has been up and

about, able to ambulate with a walker.  She continued to complain of generalized

anasarca.  Unfortunately, she has severe hypoalbuminemia with serum albumin is

only 1.8.  However, her strength has improved and she will be discharged home

with home health.



PHYSICAL EXAMINATION:

GENERAL:  When I saw her this afternoon, she was pale, but no jaundice, cyanosis

or thyromegaly.  No jugular venous distention.  No limb edema.

VITAL SIGNS:  Her heart rate was 79, blood pressure was 113/67, temperature

97.7, respiratory rate was 20, and oxygen saturation was 100%.

HEAD, EYES, EARS, NOSE AND THROAT:  Showed normocephalic and atraumatic.

NECK:  Supple.

HEART:  Showed normal first and second heart sounds.  No gallop, rub or murmur.

CHEST:  Clear to auscultation.  No crepitation or rhonchi.

ABDOMEN:  Distended, soft, nontender.

NEUROLOGIC:  She was awake, alert, responding appropriately.  All cranial nerves

are intact.  She moves extremities without difficulty.  She ambulates with a

walker.



Her intake over the last 24 hours was 570, no output was recorded.



LABORATORY DATA:  This morning showed a serum sodium 142, potassium 3.9,

chloride 112, bicarbonate 20, anion gap of 10, BUN 36, creatinine 2.4, estimated

GFR was 19 mL per minute.  Her glucose 108, calcium was 8.4.  Total bilirubin,

AST, ALT, alkaline phosphatase were normal.  Her total protein was 5.9, albumin

was 1.8.  White cell count was 5400, hemoglobin 8, hematocrit 24, MCV 98 and

platelet count of 141,000.



DISCHARGE MEDICATIONS:  She will be discharged home to continue on a tapering

course of vancomycin.  She should be on 125 mg capsule 3 times a day for 2 weeks

and then 2 times a day for 2 weeks.  She was also discharged on cholestyramine,

Questran 1 packet twice a day for 2 weeks, Tylenol 650 mg every 6 hours,

allopurinol 300 mg daily, aspirin 81 mg once a day, colchicine 0.6 mg once a

day, cyanocobalamin 2500 mcg tablet once a day, Nexium 40 mg daily, estrogen

conjugated 0.5 mg tablet daily for hormone replacement, gabapentin 100 mg at

bedtime, glimepiride 4 mg daily, multivitamin with fusion plus capsule 1 capsule

once a day, losartan potassium 25 mg once a day, and ondansetron 8 mg every 8

hours.



FINAL DISCHARGE DIAGNOSES:

1.  Clostridium difficile colitis, resolved.  She will be discharged with a

tapering course of vancomycin.

2.  Acute-on-chronic kidney injury, resolved.  Her creatinine has plateaued

around 2.4.

3.  Severe protein-calorie malnutrition.

4.  Type 2 diabetes mellitus.

5.  Gout.

6.  Diabetic neuropathy, given her creatinine has risen to 2.4, I will cut down

her allopurinol probably down to 100 mg once a day.





______________________________

ALICIA MOLINA MD



DR:  CONSUELO/kathy  JOB#:  [id number] / [id number]

DD:  05/08/2020 13:47  DT:  05/08/2020 14:21

## 2020-05-08 NOTE — DISCH
HOME HEALTH DISCHARGE/MEDS


DISCHARGE INFORMATION:


Discharge Date:  May 8, 2020


Final Diagnosis:


C Difficile colitis


Acute on chronic kidney injury


Condition on Discharge:  Stable





CODE STATUS:


Code Status:  Full





HOME HEALTH:


Face to Face:


I certify this patient is under my care and that I, or a nurse practitioner or 

physician's assistant working with me, had a face to face encounter that meets 

the physician face to face encounter requirements with this patient on 05/08/20


Medical Condition(s):  Other


Skilled Nursing For:  Admin/Educate Injections


Physical Therapy For:  Evalulation/Treatment


Occupational Therapy For:  Evaluation/Treatment





POST DISCHARGE ORDERS:


Activity Instructions for Disc:  Resume previous activity


DIET AFTER DISCHARGE:  ADA





CERTIFICATION STATEMENT:


Certification Statement: Based on the above finding, I certify that this patient

is confined to the home and needs intermittent skilled nursing care, physical 

therapy and/or speech therapy, or continues to need occupational therapy.~ This 

patient is under my care, and I have initiated the establishment of the plan of 

care.~ This patient will be followed by myself or a community physician who will

periodically review the plan of care.





DISCHARGE MEDICATIONS:


Home Meds


Reported Medications


Esomeprazole Magnesium (NEXIUM CAPSULE) 40 Mg Capsule.dr, 1 CAP PO DAILY for 

HEARTBURN


   4/22/20


Acetaminophen (ACETAMINOPHEN) 325 Mg Tablet, 2 TAB PO PRN DAILY PRN for PAIN OR 

FEVER


   4/22/20


Aspirin (ASPIRIN) 81 Mg Tab.chew, 81 MG PO DAILY for HEART HEALTH


   4/22/20


Cyanocobalamin (Vitamin B-12) (VITAMIN B-12) 2,500 Mcg Tab.subl, 1 TAB SL DAILY 

for SUPPLEMENT


   4/22/20


Gabapentin (Gabapentin) 100 Mg Capsule, 1 CAP PO HS for NERVE PAIN


   4/22/20


Estrogens, Conjugated (PREMARIN) 0.45 Mg Tablet, 0.5 TAB PO DAILY for HORMONE 

SUPPLEMENT


   4/22/20


Glimepiride (GLIMEPIRIDE) 4 Mg Tablet, 1 TAB PO DAILY for DIABETES-HIGH BLOOD 

SUGAR


   4/22/20


Losartan Potassium (LOSARTAN POTASSIUM **) 25 Mg Tablet, 1 TAB PO DAILY for HIGH

BLOOD PRESSURE


   4/22/20


Colchicine (Colchicine) 0.6 Mg Tablet, 1 TAB PO DAILY for GOUT


   4/22/20


Allopurinol (ALLOPURINOL) 300 Mg Tablet, 1 TAB PO DAILY for GOUT


   4/22/20


Iron,Fum&Ps/Fa/Vit B&C#18/L.ca (FUSION PLUS CAPSULE) 1 Each Capsule, 1 CAP PO 

DAILY for SUPPLEMENT


   4/22/20


Ondansetron Hcl (ONDANSETRON HCL) 8 Mg Tablet, 1 TAB PO Q8HRS for NAUSEA


   4/22/20











ALICIA MOLINA MD                 May 8, 2020 13:42

## 2020-07-14 ENCOUNTER — HOSPITAL ENCOUNTER (OUTPATIENT)
Dept: HOSPITAL 63 - DXRAD | Age: 75
End: 2020-07-14
Attending: PHYSICIAN ASSISTANT
Payer: MEDICARE

## 2020-07-14 DIAGNOSIS — J90: ICD-10-CM

## 2020-07-14 DIAGNOSIS — J98.11: Primary | ICD-10-CM

## 2020-07-14 PROCEDURE — 71046 X-RAY EXAM CHEST 2 VIEWS: CPT

## 2020-07-14 NOTE — RAD
CHEST PA   LATERAL

 

Clinical indications: Reason: EDEMA, ABNORMAL CHEST SOUNDS, COUGH / Spl. 

Instructions:  / History:  

 

COMPARISON: April 3, 2020.

 

Findings: Small bilateral pleural effusions are seen right greater than 

left. There is mild bibasilar atelectasis present. No perihilar pulmonary 

edema is seen. No pneumothorax is evident. The heart size, pulmonary 

vasculature, mediastinum and both nicolas are unremarkable. Old healed 

fracture of the lateral left third rib is seen.

 

Impression: Small bilateral pleural effusions right greater than left.

 

Electronically signed by: Rhett Mccullough MD (7/14/2020 5:11 PM) [id number]

## 2020-07-16 ENCOUNTER — HOSPITAL ENCOUNTER (OUTPATIENT)
Dept: HOSPITAL 61 - ONCLAB | Age: 75
Discharge: HOME | End: 2020-07-16
Attending: INTERNAL MEDICINE
Payer: MEDICARE

## 2020-07-16 DIAGNOSIS — N18.9: Primary | ICD-10-CM

## 2020-07-16 DIAGNOSIS — D63.1: ICD-10-CM

## 2020-07-16 PROCEDURE — 82746 ASSAY OF FOLIC ACID SERUM: CPT

## 2020-07-16 PROCEDURE — 83520 IMMUNOASSAY QUANT NOS NONAB: CPT

## 2020-07-16 PROCEDURE — 84165 PROTEIN E-PHORESIS SERUM: CPT

## 2020-07-16 PROCEDURE — 36415 COLL VENOUS BLD VENIPUNCTURE: CPT

## 2020-07-16 PROCEDURE — 82607 VITAMIN B-12: CPT

## 2020-07-20 LAB
ALBUM: 3 G/DL (ref 2.9–4.4)
ALPHA1 GLOB SERPL ELPH-MCNC: 0.2 G/DL (ref 0–0.4)
ALPHA2 GLOB SERPL ELPH-MCNC: 0.5 G/DL (ref 0.4–1)
B-GLOBULIN SERPL ELPH-MCNC: 0.9 G/DL (ref 0.7–1.3)
GAMMA GLOB SERPL ELPH-MCNC: 1.5 G/DL (ref 0.4–1.8)
KAPPA LC SERPL-MCNC: 112 MG/L (ref 3.3–19.4)
KAPPA LC/LAMBDA SER: 1.08 {RATIO} (ref 0.26–1.65)
LAMBDA LC FREE SERPL-MCNC: 103.8 MG/L (ref 5.7–26.3)
PROT SERPL-MCNC: 6.1 G/DL (ref 6–8.5)
SPEP AG RATIO: 1 (ref 0.7–1.7)

## 2020-08-28 ENCOUNTER — HOSPITAL ENCOUNTER (OUTPATIENT)
Dept: HOSPITAL 63 - NM | Age: 75
Discharge: HOME | End: 2020-08-28
Attending: PHYSICIAN ASSISTANT
Payer: MEDICARE

## 2020-08-28 DIAGNOSIS — R19.7: ICD-10-CM

## 2020-08-28 DIAGNOSIS — K30: Primary | ICD-10-CM

## 2020-08-28 PROCEDURE — 78264 GASTRIC EMPTYING IMG STUDY: CPT

## 2020-08-28 PROCEDURE — A9541 TC99M SULFUR COLLOID: HCPCS

## 2020-08-28 NOTE — RAD
EXAM: Nuclear gastric emptying scan.

 

HISTORY: Diarrhea. Early satiety. Bloating.

 

COMPARISON: None.

 

TECHNIQUE: Serial static images were obtained over the stomach following 

oral administration of 2 mCi 99m-Tc sulfur colloid.

 

FINDINGS: The stomach empties into the small bowel without evidence of 

reflux in the area of the esophagus. There is 40 percent retained tracer 

activity within stomach at one hour (normal 34.8 percent to 91 percent). 

There is 30 percent retained tracer activity within stomach at 2 hours 

(normal 2.7 percent to 60 percent). There is 22 percent retained tracer 

activity within stomach at 3 hours (normal 0.5 percent to 28 percent). 

There is 11 percent retained tracer activity within stomach at 4 hours 

(normal 0.0 percent to 10 percent).

 

The estimated time for half emptying of gastric contents, i.e. 'gastric 

emptying time' is 52 minutes (normal is 66 +/- 22 minutes). 

 

IMPRESSION: Normal gastric emptying time at one hour, 2 hours and 3 hours 

and normal gastric emptying half-time of 52 minutes. There is slightly 

delayed gastric emptying at 4 hours.

 

Electronically signed by: Robyn Eastman MD (8/28/2020 1:38 PM) [facility]

## 2020-09-21 ENCOUNTER — HOSPITAL ENCOUNTER (OUTPATIENT)
Dept: HOSPITAL 63 - US | Age: 75
Discharge: HOME | End: 2020-09-21
Payer: MEDICARE

## 2020-09-21 DIAGNOSIS — R14.0: ICD-10-CM

## 2020-09-21 DIAGNOSIS — R16.0: Primary | ICD-10-CM

## 2020-09-21 DIAGNOSIS — K76.89: ICD-10-CM

## 2020-09-21 PROCEDURE — 76700 US EXAM ABDOM COMPLETE: CPT

## 2020-09-21 NOTE — RAD
CLINICAL HISTORY: Reason: ABD PAIN AND BLOATING / Spl. Instructions:  / 

History: 

 

COMPARISON: None available.

 

TECHNIQUE: Ultrasound of the upper abdomen was performed.

 

FINDINGS:

The liver measures 13.4 cm in length in the right mid clavicular line.  

The hepatic margin is mildly nodular but the the hepatic echogenicity is 

normal.  There are no focal liver lesions. Flow seen within the portal 

veins.

 

There has been a cholecystectomy.

 

The common bile duct measures 0.7 cm. 

 

The spleen is enlarged in size, measuring 14.5 cm.

 

The head and body of the pancreas are unremarkable.  The tail is obscured 

by intestinal gas..

 

The right kidney measures 9.5 cm in bipolar length. The left kidney 

measures 9.4 cm in bipolar length. Echogenic appearance of the kidneys 

bilaterally. No definite renal lesion. No hydronephrosis.

 

Visualized portions of the abdominal aorta and inferior vena cava are 

unremarkable.

 

There is moderate abdominal ascites.

 

IMPRESSION: 

1.  Mildly nodular appearance of the liver may be seen with cirrhosis. 

Changes of the portal hypertension including splenomegaly and abdominal 

ascites.

2.  Echogenic appearance of the kidneys may be seen with medical renal 

disease. No definite renal lesion or hydronephrosis.

 

Electronically signed by: Yoshi Mccarthy MD (9/21/2020 10:50 AM) [id number]

## 2020-11-05 ENCOUNTER — HOSPITAL ENCOUNTER (OUTPATIENT)
Dept: HOSPITAL 61 - INTRAD | Age: 75
Discharge: HOME | End: 2020-11-05
Attending: INTERNAL MEDICINE
Payer: MEDICARE

## 2020-11-05 VITALS — BODY MASS INDEX: 24.43 KG/M2 | HEIGHT: 66 IN | WEIGHT: 152 LBS

## 2020-11-05 VITALS — DIASTOLIC BLOOD PRESSURE: 66 MMHG | SYSTOLIC BLOOD PRESSURE: 111 MMHG

## 2020-11-05 VITALS — SYSTOLIC BLOOD PRESSURE: 108 MMHG | DIASTOLIC BLOOD PRESSURE: 59 MMHG

## 2020-11-05 VITALS — DIASTOLIC BLOOD PRESSURE: 63 MMHG | SYSTOLIC BLOOD PRESSURE: 123 MMHG

## 2020-11-05 VITALS — DIASTOLIC BLOOD PRESSURE: 65 MMHG | SYSTOLIC BLOOD PRESSURE: 119 MMHG

## 2020-11-05 VITALS — DIASTOLIC BLOOD PRESSURE: 59 MMHG | SYSTOLIC BLOOD PRESSURE: 103 MMHG

## 2020-11-05 VITALS — SYSTOLIC BLOOD PRESSURE: 122 MMHG | DIASTOLIC BLOOD PRESSURE: 60 MMHG

## 2020-11-05 VITALS — DIASTOLIC BLOOD PRESSURE: 65 MMHG | SYSTOLIC BLOOD PRESSURE: 104 MMHG

## 2020-11-05 VITALS — DIASTOLIC BLOOD PRESSURE: 65 MMHG | SYSTOLIC BLOOD PRESSURE: 106 MMHG

## 2020-11-05 DIAGNOSIS — Z98.890: ICD-10-CM

## 2020-11-05 DIAGNOSIS — N18.4: ICD-10-CM

## 2020-11-05 DIAGNOSIS — M10.9: ICD-10-CM

## 2020-11-05 DIAGNOSIS — I12.9: ICD-10-CM

## 2020-11-05 DIAGNOSIS — M19.90: ICD-10-CM

## 2020-11-05 DIAGNOSIS — Z79.84: ICD-10-CM

## 2020-11-05 DIAGNOSIS — R18.8: Primary | ICD-10-CM

## 2020-11-05 DIAGNOSIS — Z88.2: ICD-10-CM

## 2020-11-05 DIAGNOSIS — Z72.89: ICD-10-CM

## 2020-11-05 DIAGNOSIS — Z87.891: ICD-10-CM

## 2020-11-05 DIAGNOSIS — Z79.899: ICD-10-CM

## 2020-11-05 DIAGNOSIS — K21.9: ICD-10-CM

## 2020-11-05 DIAGNOSIS — Z90.49: ICD-10-CM

## 2020-11-05 DIAGNOSIS — Z88.8: ICD-10-CM

## 2020-11-05 DIAGNOSIS — E11.22: ICD-10-CM

## 2020-11-05 LAB
ALBUMIN SERPL-MCNC: 2.9 G/DL (ref 3.4–5)
ALBUMIN/GLOB SERPL: 0.8 {RATIO} (ref 1–1.7)
ALP SERPL-CCNC: 156 U/L (ref 46–116)
ALT SERPL-CCNC: 29 U/L (ref 14–59)
ANION GAP SERPL CALC-SCNC: 14 MMOL/L (ref 6–14)
ANISOCYTOSIS BLD QL SMEAR: (no result)
AST SERPL-CCNC: 27 U/L (ref 15–37)
BASOPHILS # BLD AUTO: 0 X10^3/UL (ref 0–0.2)
BASOPHILS NFR BLD: 1 % (ref 0–3)
BF MON %: 66 %
BF OTHER %: 14 %
BF PMN %: 20 %
BF RBC COUNT: 200 /CMM
BF SOURCE: (no result)
BF WBC COUNT: 318 /CMM
BILIRUB SERPL-MCNC: 0.8 MG/DL (ref 0.2–1)
BUN SERPL-MCNC: 43 MG/DL (ref 7–20)
BUN/CREAT SERPL: 20 (ref 6–20)
CALCIUM SERPL-MCNC: 9 MG/DL (ref 8.5–10.1)
CHLORIDE SERPL-SCNC: 107 MMOL/L (ref 98–107)
CLARITY SPEC: (no result)
CO2 SERPL-SCNC: 19 MMOL/L (ref 21–32)
COLOR FLD: YELLOW
CREAT SERPL-MCNC: 2.2 MG/DL (ref 0.6–1)
EOSINOPHIL NFR BLD: 0.1 X10^3/UL (ref 0–0.7)
EOSINOPHIL NFR BLD: 1 % (ref 0–3)
ERYTHROCYTE [DISTWIDTH] IN BLOOD BY AUTOMATED COUNT: 21 % (ref 11.5–14.5)
GFR SERPLBLD BASED ON 1.73 SQ M-ARVRAT: 21.9 ML/MIN
GLUCOSE SERPL-MCNC: 153 MG/DL (ref 70–99)
HCT VFR BLD CALC: 29.3 % (ref 36–47)
HGB BLD-MCNC: 9.5 G/DL (ref 12–15.5)
LYMPHOCYTES # BLD: 0.9 X10^3/UL (ref 1–4.8)
LYMPHOCYTES NFR BLD AUTO: 14 % (ref 24–48)
MCH RBC QN AUTO: 32 PG (ref 25–35)
MCHC RBC AUTO-ENTMCNC: 33 G/DL (ref 31–37)
MCV RBC AUTO: 99 FL (ref 79–100)
MONO #: 0.3 X10^3/UL (ref 0–1.1)
MONOCYTES NFR BLD: 5 % (ref 0–9)
NEUT #: 5.2 X10^3/UL (ref 1.8–7.7)
NEUTROPHILS NFR BLD AUTO: 79 % (ref 31–73)
OVALOCYTES BLD QL SMEAR: (no result)
PLATELET # BLD AUTO: 121 X10^3/UL (ref 140–400)
PLATELET # BLD EST: (no result) 10*3/UL
POTASSIUM SERPL-SCNC: 4 MMOL/L (ref 3.5–5.1)
PROT SERPL-MCNC: 6.6 G/DL (ref 6.4–8.2)
PROTHROMBIN TIME: 14.8 SEC (ref 11.7–14)
RBC # BLD AUTO: 2.97 X10^6/UL (ref 3.5–5.4)
SODIUM SERPL-SCNC: 140 MMOL/L (ref 136–145)
WBC # BLD AUTO: 6.6 X10^3/UL (ref 4–11)

## 2020-11-05 PROCEDURE — 87075 CULTR BACTERIA EXCEPT BLOOD: CPT

## 2020-11-05 PROCEDURE — C1892 INTRO/SHEATH,FIXED,PEEL-AWAY: HCPCS

## 2020-11-05 PROCEDURE — 87071 CULTURE AEROBIC QUANT OTHER: CPT

## 2020-11-05 PROCEDURE — P9046 ALBUMIN (HUMAN), 25%, 20 ML: HCPCS

## 2020-11-05 PROCEDURE — 85610 PROTHROMBIN TIME: CPT

## 2020-11-05 PROCEDURE — 82042 OTHER SOURCE ALBUMIN QUAN EA: CPT

## 2020-11-05 PROCEDURE — 36415 COLL VENOUS BLD VENIPUNCTURE: CPT

## 2020-11-05 PROCEDURE — 49083 ABD PARACENTESIS W/IMAGING: CPT

## 2020-11-05 PROCEDURE — 84157 ASSAY OF PROTEIN OTHER: CPT

## 2020-11-05 PROCEDURE — 85025 COMPLETE CBC W/AUTO DIFF WBC: CPT

## 2020-11-05 PROCEDURE — 80053 COMPREHEN METABOLIC PANEL: CPT

## 2020-11-05 PROCEDURE — 89050 BODY FLUID CELL COUNT: CPT

## 2020-11-05 NOTE — NUR
Discharge Note:



[facility]



Discharge instructions and discharge home medications reviewed with Patient and a copy 
given. All questions have been answered and understanding verbalized. 



The following instructions and handouts were given: PARACENTESIS



Discontinued IV AND BANDAID APPLIED, NO COMPLICATIONS.



Patient discharged to HOME with FAMILY via PRIVATE VEHICLE.

## 2020-11-06 NOTE — RAD
Procedure: Ultrasound guided paracentesis



Clinical Indication: Adult female with abdominal ascites



Sedation: Local anesthesia only



Antibiotics: None



Fluoro Time: None



Contrast: Not applicable



Sterility: The procedure was performed in its entirety using appropriate

elements of sterile technique.



Consent: The procedure was explained in its entirety to the patient or the

patients designated representative by a member of the treatment team,

including a discussion of the risks, benefits and commonly accepted

alternatives to the procedure, as well as the expected consequences of no

therapy whatsoever.   Discussion of the risks included, but was not limited

to, those that are most frequent and those that are rare but possibly severe

or life-threatening, as well as the possibility of unforeseen complications.



Technique and Findings: Following informed consent, the patient was prepped

and draped in the usual sterile fashion.  Ultrasound interrogation of the

abdomen revealed abdominal ascites.  A hard copy ultrasound image was

recorded.  1% Lidocaine was used to achieve local anesthesia over the area of

interest, and a 6 Kiswahili Safe-T-Centesis catheter was advanced into the

peritoneal cavity under ultrasound guidance.  3800 cc of thin yellow ascites

was then withdrawn.  The catheter was removed and hemostasis was achieved with

manual compression.  



Complications: No immediate



Impression:

1. Ultrasound-guided paracentesis as described

## 2020-11-06 NOTE — PATHOLOGY
Note

[facility] Accession Number: [id number]

   TESTS               RESULT  FLAG  UNITS    REF RANGE  LAB

------------------------------------------------------------

   Clinician Provided Cytology Information

   No. of containers..01 Other (Miscellaneous)

Source:                                                   01

   ASCITES

DIAGNOSIS:                                                02

   ASCITES

   NEGATIVE FOR MALIGNANT EPITHELIAL CELLS.

   MESOTHELIAL CELLS ARE PRESENT.

   THIS INTERPRETATION INCLUDES EVALUATION OF A CELL BLOCK.

   MESOTHELIAL CELLS WITH REACTIVE CHANGES ARE PRESENT WITH BACKGROUND

   PREDOMINANTLY CHRONIC INFLAMMATORY CELLS AND RED BLOOD CELLS.

Pathologist ICD10:                                        02

   R18.8

Signed out by:                                            02

   Farida Alva MD, Pathologist

   NPI- [id number]

Performed by:                                             01

   Janice Hines, Cytotechnologist ([facility])

Gross description:                                        01

   30ML, YELLOW, 1 TP 1 CB

   /LCS  11/05/2020  1544 Local



------------------------------------------------------------

    FLAG LEGEND:

    L-Low Normal,H-High Normal,LL-Alert Low,HH-Alert High

    <-Panic Low,>-Panic High,A-Abnormal,AA-Critical Abnormal

------------------------------------------------------------



Performed at:

[address] [facility]

   [address]

   [place]  [address]

   Omid Narayanan MD, Phone: [phone]

[address] [facility]

   [address] [place]  [address]

   Jose Kern MD, Phone: [phone]

Specimen Comment: A courtesy copy of this report has been sent to [phone], [phone]

Specimen Comment: 2422, [phone]

Specimen Comment: Report sent to ,DR SOLIS / DR DELVALLE

***Performed at:  01

   [address], [place], [place]  [id number]

   MD Omid Narayanan MD Phone:  [phone]

## 2020-12-18 ENCOUNTER — HOSPITAL ENCOUNTER (OUTPATIENT)
Dept: HOSPITAL 63 - MAMMO | Age: 75
End: 2020-12-18
Attending: PHYSICIAN ASSISTANT
Payer: MEDICARE

## 2020-12-18 DIAGNOSIS — Z12.31: Primary | ICD-10-CM

## 2020-12-18 DIAGNOSIS — N64.89: ICD-10-CM

## 2020-12-18 PROCEDURE — 77067 SCR MAMMO BI INCL CAD: CPT

## 2020-12-18 PROCEDURE — 77063 BREAST TOMOSYNTHESIS BI: CPT

## 2020-12-21 NOTE — RAD
DATE: 12/18/2020 1:34 PM



EXAM: MAMMO YUNIEL SCREENING BILATERAL



HISTORY:  Screening



COMPARISON: Bilateral mammograms of 10/2/2019 and 7/18/2018



Bilateral CC and MLO views of the breasts were performed. Bilateral breast

tomosynthesis was performed in CC and MLO projections.



This study was interpreted with the benefit of Computerized Aided Detection

(CAD).





FINDINGS:



Breast Density: SCATTERED  The breast parenchyma shows scattered

fibroglandular densities. Breast parenchyma level B



Interval thickening of the trabecular markings and skin thickening

bilaterally, suggesting bilateral breast edema.

No suspicious masses, microcalcifications or architectural distortion is

present to suggest malignancy in either breast.





The visualized axillae are unremarkable. 



IMPRESSION: No mammographic evidence of malignancy. 



BI-RADS CATEGORY: 2 BENIGN FINDING(S)



RECOMMENDED FOLLOW-UP: 12M 12 MONTH FOLLOW-UP Annual screening mammography is

recommended, unless clinically indicated sooner based on symptoms or change in

physical exam. Clinical management of bilateral breast edema, possibly from

central venous occlusion or heart failure is recommended.



PQRS compliance statement: Patient information was entered into a reminder

system with a target due date for the next mammogram.



Mammography is a sensitive method for finding small breast cancers, but it

does not detect them all and is not a substitute for careful clinical

examination.  A negative mammogram does not negate a clinically suspicious

finding and should not result in delay in biopsying a clinically suspicious

abnormality.



"Our facility is accredited by the [employer] Mammography

Program."

## 2021-01-13 ENCOUNTER — HOSPITAL ENCOUNTER (OUTPATIENT)
Dept: HOSPITAL 61 - INTRAD | Age: 76
Discharge: HOME | End: 2021-01-13
Attending: INTERNAL MEDICINE
Payer: MEDICARE

## 2021-01-13 VITALS — SYSTOLIC BLOOD PRESSURE: 119 MMHG | DIASTOLIC BLOOD PRESSURE: 65 MMHG

## 2021-01-13 VITALS — SYSTOLIC BLOOD PRESSURE: 118 MMHG | DIASTOLIC BLOOD PRESSURE: 64 MMHG

## 2021-01-13 VITALS — SYSTOLIC BLOOD PRESSURE: 129 MMHG | DIASTOLIC BLOOD PRESSURE: 66 MMHG

## 2021-01-13 VITALS — HEIGHT: 66 IN | WEIGHT: 160 LBS | BODY MASS INDEX: 25.71 KG/M2

## 2021-01-13 VITALS — SYSTOLIC BLOOD PRESSURE: 107 MMHG | DIASTOLIC BLOOD PRESSURE: 67 MMHG

## 2021-01-13 VITALS — SYSTOLIC BLOOD PRESSURE: 135 MMHG | DIASTOLIC BLOOD PRESSURE: 70 MMHG

## 2021-01-13 VITALS — SYSTOLIC BLOOD PRESSURE: 118 MMHG | DIASTOLIC BLOOD PRESSURE: 65 MMHG

## 2021-01-13 VITALS — DIASTOLIC BLOOD PRESSURE: 66 MMHG | SYSTOLIC BLOOD PRESSURE: 126 MMHG

## 2021-01-13 VITALS — SYSTOLIC BLOOD PRESSURE: 135 MMHG | DIASTOLIC BLOOD PRESSURE: 72 MMHG

## 2021-01-13 DIAGNOSIS — N18.4: ICD-10-CM

## 2021-01-13 DIAGNOSIS — Z79.84: ICD-10-CM

## 2021-01-13 DIAGNOSIS — K21.9: ICD-10-CM

## 2021-01-13 DIAGNOSIS — I12.9: ICD-10-CM

## 2021-01-13 DIAGNOSIS — Z79.82: ICD-10-CM

## 2021-01-13 DIAGNOSIS — Z88.2: ICD-10-CM

## 2021-01-13 DIAGNOSIS — Z79.899: ICD-10-CM

## 2021-01-13 DIAGNOSIS — M10.9: ICD-10-CM

## 2021-01-13 DIAGNOSIS — M19.90: ICD-10-CM

## 2021-01-13 DIAGNOSIS — Z98.890: ICD-10-CM

## 2021-01-13 DIAGNOSIS — E11.22: ICD-10-CM

## 2021-01-13 DIAGNOSIS — R18.8: ICD-10-CM

## 2021-01-13 DIAGNOSIS — Z90.710: ICD-10-CM

## 2021-01-13 DIAGNOSIS — Z88.8: ICD-10-CM

## 2021-01-13 DIAGNOSIS — K74.69: Primary | ICD-10-CM

## 2021-01-13 DIAGNOSIS — Z87.891: ICD-10-CM

## 2021-01-13 LAB
BASOPHILS # BLD AUTO: 0 X10^3/UL (ref 0–0.2)
BASOPHILS NFR BLD: 1 % (ref 0–3)
EOSINOPHIL NFR BLD: 0.2 X10^3/UL (ref 0–0.7)
EOSINOPHIL NFR BLD: 7 % (ref 0–3)
ERYTHROCYTE [DISTWIDTH] IN BLOOD BY AUTOMATED COUNT: 17.4 % (ref 11.5–14.5)
HCT VFR BLD CALC: 28.3 % (ref 36–47)
HGB BLD-MCNC: 9.2 G/DL (ref 12–15.5)
LYMPHOCYTES # BLD: 1.1 X10^3/UL (ref 1–4.8)
LYMPHOCYTES NFR BLD AUTO: 31 % (ref 24–48)
MCH RBC QN AUTO: 33 PG (ref 25–35)
MCHC RBC AUTO-ENTMCNC: 32 G/DL (ref 31–37)
MCV RBC AUTO: 100 FL (ref 79–100)
MONO #: 0.2 X10^3/UL (ref 0–1.1)
MONOCYTES NFR BLD: 7 % (ref 0–9)
NEUT #: 1.9 X10^3/UL (ref 1.8–7.7)
NEUTROPHILS NFR BLD AUTO: 55 % (ref 31–73)
PLATELET # BLD AUTO: 116 X10^3/UL (ref 140–400)
PROTHROMBIN TIME: 15.5 SEC (ref 11.7–14)
RBC # BLD AUTO: 2.82 X10^6/UL (ref 3.5–5.4)
WBC # BLD AUTO: 3.5 X10^3/UL (ref 4–11)

## 2021-01-13 PROCEDURE — 85025 COMPLETE CBC W/AUTO DIFF WBC: CPT

## 2021-01-13 PROCEDURE — C1892 INTRO/SHEATH,FIXED,PEEL-AWAY: HCPCS

## 2021-01-13 PROCEDURE — 85610 PROTHROMBIN TIME: CPT

## 2021-01-13 PROCEDURE — 36415 COLL VENOUS BLD VENIPUNCTURE: CPT

## 2021-01-13 PROCEDURE — 49083 ABD PARACENTESIS W/IMAGING: CPT

## 2021-01-13 PROCEDURE — P9046 ALBUMIN (HUMAN), 25%, 20 ML: HCPCS

## 2021-01-13 NOTE — RAD
Procedure: Ultrasound guided paracentesis



Clinical Indication: Adult female with abdominal ascites



Sedation: Local anesthesia only



Antibiotics: None



Fluoro Time: None



Contrast: Not applicable



Sterility: The procedure was performed in its entirety using appropriate

elements of sterile technique.



Consent: The procedure was explained in its entirety to the patient or the

patients designated representative by a member of the treatment team,

including a discussion of the risks, benefits and commonly accepted

alternatives to the procedure, as well as the expected consequences of no

therapy whatsoever.   Discussion of the risks included, but was not limited

to, those that are most frequent and those that are rare but possibly severe

or life-threatening, as well as the possibility of unforeseen complications.



Technique and Findings: Following informed consent, the patient was prepped

and draped in the usual sterile fashion.  Ultrasound interrogation of the

abdomen revealed abdominal ascites.  A hard copy ultrasound image was

recorded.  1% Lidocaine was used to achieve local anesthesia over the area of

interest, and a 6 Czech Safe-T-Centesis catheter was advanced into the

peritoneal cavity under ultrasound guidance.  4300 cc of thin yellow ascites

was then withdrawn.  The catheter was removed and hemostasis was achieved with

manual compression.  



Complications: No immediate



Impression:

1. Ultrasound-guided paracentesis as described

## 2021-01-13 NOTE — NUR
Discharge Note:



ELISE CERON



Discharge instructions and discharge home medications reviewed with Patient and a copy 
given. All questions have been answered and understanding verbalized. 



The following instructions and handouts were given: paracentesis



Discontinued lines and drains: Peripheral IV intact.



Patient discharged to Home or Self Care withFamily Membervia Wheelchair

## 2021-02-25 ENCOUNTER — HOSPITAL ENCOUNTER (OUTPATIENT)
Dept: HOSPITAL 61 - INTRAD | Age: 76
Discharge: HOME | End: 2021-02-25
Attending: INTERNAL MEDICINE
Payer: MEDICARE

## 2021-02-25 VITALS — SYSTOLIC BLOOD PRESSURE: 131 MMHG | DIASTOLIC BLOOD PRESSURE: 68 MMHG

## 2021-02-25 VITALS — SYSTOLIC BLOOD PRESSURE: 124 MMHG | DIASTOLIC BLOOD PRESSURE: 63 MMHG

## 2021-02-25 VITALS — SYSTOLIC BLOOD PRESSURE: 132 MMHG | DIASTOLIC BLOOD PRESSURE: 66 MMHG

## 2021-02-25 VITALS — SYSTOLIC BLOOD PRESSURE: 124 MMHG | DIASTOLIC BLOOD PRESSURE: 76 MMHG

## 2021-02-25 VITALS — SYSTOLIC BLOOD PRESSURE: 133 MMHG | DIASTOLIC BLOOD PRESSURE: 67 MMHG

## 2021-02-25 VITALS — DIASTOLIC BLOOD PRESSURE: 72 MMHG | SYSTOLIC BLOOD PRESSURE: 127 MMHG

## 2021-02-25 VITALS — WEIGHT: 172 LBS | HEIGHT: 66 IN | BODY MASS INDEX: 27.64 KG/M2

## 2021-02-25 DIAGNOSIS — Z87.891: ICD-10-CM

## 2021-02-25 DIAGNOSIS — M19.90: ICD-10-CM

## 2021-02-25 DIAGNOSIS — Z98.890: ICD-10-CM

## 2021-02-25 DIAGNOSIS — I12.9: ICD-10-CM

## 2021-02-25 DIAGNOSIS — Z88.8: ICD-10-CM

## 2021-02-25 DIAGNOSIS — Z79.899: ICD-10-CM

## 2021-02-25 DIAGNOSIS — Z90.49: ICD-10-CM

## 2021-02-25 DIAGNOSIS — Z90.710: ICD-10-CM

## 2021-02-25 DIAGNOSIS — K21.9: ICD-10-CM

## 2021-02-25 DIAGNOSIS — R18.8: Primary | ICD-10-CM

## 2021-02-25 DIAGNOSIS — E11.22: ICD-10-CM

## 2021-02-25 DIAGNOSIS — Z88.2: ICD-10-CM

## 2021-02-25 DIAGNOSIS — M10.9: ICD-10-CM

## 2021-02-25 DIAGNOSIS — Z79.84: ICD-10-CM

## 2021-02-25 DIAGNOSIS — Z79.82: ICD-10-CM

## 2021-02-25 DIAGNOSIS — N18.4: ICD-10-CM

## 2021-02-25 LAB
ANION GAP SERPL CALC-SCNC: 7 MMOL/L (ref 6–14)
BASOPHILS # BLD AUTO: 0 X10^3/UL (ref 0–0.2)
BASOPHILS NFR BLD: 1 % (ref 0–3)
BUN SERPL-MCNC: 32 MG/DL (ref 7–20)
CALCIUM SERPL-MCNC: 8.7 MG/DL (ref 8.5–10.1)
CHLORIDE SERPL-SCNC: 105 MMOL/L (ref 98–107)
CO2 SERPL-SCNC: 26 MMOL/L (ref 21–32)
CREAT SERPL-MCNC: 2.9 MG/DL (ref 0.6–1)
EOSINOPHIL NFR BLD: 0.2 X10^3/UL (ref 0–0.7)
EOSINOPHIL NFR BLD: 6 % (ref 0–3)
ERYTHROCYTE [DISTWIDTH] IN BLOOD BY AUTOMATED COUNT: 18.3 % (ref 11.5–14.5)
GFR SERPLBLD BASED ON 1.73 SQ M-ARVRAT: 15.9 ML/MIN
GLUCOSE SERPL-MCNC: 123 MG/DL (ref 70–99)
HCT VFR BLD CALC: 28.6 % (ref 36–47)
HGB BLD-MCNC: 9.2 G/DL (ref 12–15.5)
LYMPHOCYTES # BLD: 1.2 X10^3/UL (ref 1–4.8)
LYMPHOCYTES NFR BLD AUTO: 32 % (ref 24–48)
MCH RBC QN AUTO: 33 PG (ref 25–35)
MCHC RBC AUTO-ENTMCNC: 32 G/DL (ref 31–37)
MCV RBC AUTO: 101 FL (ref 79–100)
MONO #: 0.3 X10^3/UL (ref 0–1.1)
MONOCYTES NFR BLD: 9 % (ref 0–9)
NEUT #: 2 X10^3/UL (ref 1.8–7.7)
NEUTROPHILS NFR BLD AUTO: 53 % (ref 31–73)
PLATELET # BLD AUTO: 116 X10^3/UL (ref 140–400)
POTASSIUM SERPL-SCNC: 4.1 MMOL/L (ref 3.5–5.1)
PROTHROMBIN TIME: 15.5 SEC (ref 11.7–14)
RBC # BLD AUTO: 2.84 X10^6/UL (ref 3.5–5.4)
SODIUM SERPL-SCNC: 138 MMOL/L (ref 136–145)
WBC # BLD AUTO: 3.7 X10^3/UL (ref 4–11)

## 2021-02-25 PROCEDURE — 80048 BASIC METABOLIC PNL TOTAL CA: CPT

## 2021-02-25 PROCEDURE — P9046 ALBUMIN (HUMAN), 25%, 20 ML: HCPCS

## 2021-02-25 PROCEDURE — C1892 INTRO/SHEATH,FIXED,PEEL-AWAY: HCPCS

## 2021-02-25 PROCEDURE — 85610 PROTHROMBIN TIME: CPT

## 2021-02-25 PROCEDURE — 85025 COMPLETE CBC W/AUTO DIFF WBC: CPT

## 2021-02-25 PROCEDURE — 49083 ABD PARACENTESIS W/IMAGING: CPT

## 2021-02-25 PROCEDURE — 36415 COLL VENOUS BLD VENIPUNCTURE: CPT

## 2021-02-25 NOTE — RAD
Procedure: Ultrasound guided paracentesis



Clinical Indication: Adult female with recurrent ascites



Sedation: Local anesthesia only



Antibiotics: None



Fluoro Time: None



Contrast: Not applicable



Sterility: The procedure was performed in its entirety using appropriate

elements of sterile technique.



Consent: The procedure was explained in its entirety to the patient or the

patients designated representative by a member of the treatment team,

including a discussion of the risks, benefits and commonly accepted

alternatives to the procedure, as well as the expected consequences of no

therapy whatsoever.   Discussion of the risks included, but was not limited

to, those that are most frequent and those that are rare but possibly severe

or life-threatening, as well as the possibility of unforeseen complications.



Technique and Findings: Following informed consent, the patient was prepped

and draped in the usual sterile fashion.  Ultrasound interrogation of the

abdomen revealed abdominal ascites.  A hard copy ultrasound image was

recorded.  1% Lidocaine was used to achieve local anesthesia over the area of

interest, and a 6 Armenian Safe-T-Centesis catheter was advanced into the

peritoneal cavity under ultrasound guidance.  6000 cc of thin yellow ascites

was then withdrawn.  The catheter was removed and hemostasis was achieved with

manual compression.  



Complications: No immediate



Impression:

1. Ultrasound-guided paracentesis as described

## 2021-04-08 ENCOUNTER — HOSPITAL ENCOUNTER (OUTPATIENT)
Dept: HOSPITAL 61 - INTRAD | Age: 76
Discharge: HOME | End: 2021-04-08
Attending: INTERNAL MEDICINE
Payer: MEDICARE

## 2021-04-08 VITALS — DIASTOLIC BLOOD PRESSURE: 55 MMHG | SYSTOLIC BLOOD PRESSURE: 104 MMHG

## 2021-04-08 VITALS — DIASTOLIC BLOOD PRESSURE: 58 MMHG | SYSTOLIC BLOOD PRESSURE: 103 MMHG

## 2021-04-08 VITALS — SYSTOLIC BLOOD PRESSURE: 119 MMHG | DIASTOLIC BLOOD PRESSURE: 62 MMHG

## 2021-04-08 VITALS
DIASTOLIC BLOOD PRESSURE: 58 MMHG | SYSTOLIC BLOOD PRESSURE: 110 MMHG | SYSTOLIC BLOOD PRESSURE: 110 MMHG | DIASTOLIC BLOOD PRESSURE: 58 MMHG

## 2021-04-08 VITALS — SYSTOLIC BLOOD PRESSURE: 116 MMHG | DIASTOLIC BLOOD PRESSURE: 59 MMHG

## 2021-04-08 VITALS — SYSTOLIC BLOOD PRESSURE: 111 MMHG | DIASTOLIC BLOOD PRESSURE: 62 MMHG

## 2021-04-08 DIAGNOSIS — I12.9: ICD-10-CM

## 2021-04-08 DIAGNOSIS — E11.22: ICD-10-CM

## 2021-04-08 DIAGNOSIS — Z90.49: ICD-10-CM

## 2021-04-08 DIAGNOSIS — Z20.822: ICD-10-CM

## 2021-04-08 DIAGNOSIS — Z99.2: ICD-10-CM

## 2021-04-08 DIAGNOSIS — K21.9: ICD-10-CM

## 2021-04-08 DIAGNOSIS — Z88.8: ICD-10-CM

## 2021-04-08 DIAGNOSIS — Z72.89: ICD-10-CM

## 2021-04-08 DIAGNOSIS — R18.8: Primary | ICD-10-CM

## 2021-04-08 DIAGNOSIS — N18.4: ICD-10-CM

## 2021-04-08 DIAGNOSIS — Z87.891: ICD-10-CM

## 2021-04-08 DIAGNOSIS — Z88.2: ICD-10-CM

## 2021-04-08 DIAGNOSIS — M10.9: ICD-10-CM

## 2021-04-08 DIAGNOSIS — Z90.710: ICD-10-CM

## 2021-04-08 DIAGNOSIS — Z98.890: ICD-10-CM

## 2021-04-08 DIAGNOSIS — M19.90: ICD-10-CM

## 2021-04-08 LAB
ANION GAP SERPL CALC-SCNC: 8 MMOL/L (ref 6–14)
BASOPHILS # BLD AUTO: 0 X10^3/UL (ref 0–0.2)
BASOPHILS NFR BLD: 1 % (ref 0–3)
BUN SERPL-MCNC: 25 MG/DL (ref 7–20)
CALCIUM SERPL-MCNC: 8.5 MG/DL (ref 8.5–10.1)
CHLORIDE SERPL-SCNC: 100 MMOL/L (ref 98–107)
CO2 SERPL-SCNC: 30 MMOL/L (ref 21–32)
CREAT SERPL-MCNC: 2.4 MG/DL (ref 0.6–1)
EOSINOPHIL NFR BLD: 0.1 X10^3/UL (ref 0–0.7)
EOSINOPHIL NFR BLD: 4 % (ref 0–3)
ERYTHROCYTE [DISTWIDTH] IN BLOOD BY AUTOMATED COUNT: 17.1 % (ref 11.5–14.5)
GFR SERPLBLD BASED ON 1.73 SQ M-ARVRAT: 19.8 ML/MIN
GLUCOSE SERPL-MCNC: 115 MG/DL (ref 70–99)
HCT VFR BLD CALC: 28.7 % (ref 36–47)
HGB BLD-MCNC: 9.5 G/DL (ref 12–15.5)
LYMPHOCYTES # BLD: 1.1 X10^3/UL (ref 1–4.8)
LYMPHOCYTES NFR BLD AUTO: 32 % (ref 24–48)
MCH RBC QN AUTO: 34 PG (ref 25–35)
MCHC RBC AUTO-ENTMCNC: 33 G/DL (ref 31–37)
MCV RBC AUTO: 101 FL (ref 79–100)
MONO #: 0.4 X10^3/UL (ref 0–1.1)
MONOCYTES NFR BLD: 10 % (ref 0–9)
NEUT #: 2 X10^3/UL (ref 1.8–7.7)
NEUTROPHILS NFR BLD AUTO: 54 % (ref 31–73)
PLATELET # BLD AUTO: 115 X10^3/UL (ref 140–400)
POTASSIUM SERPL-SCNC: 3.3 MMOL/L (ref 3.5–5.1)
PROTHROMBIN TIME: 14.3 SEC (ref 11.7–14)
RBC # BLD AUTO: 2.84 X10^6/UL (ref 3.5–5.4)
SODIUM SERPL-SCNC: 138 MMOL/L (ref 136–145)
WBC # BLD AUTO: 3.6 X10^3/UL (ref 4–11)

## 2021-04-08 PROCEDURE — 85610 PROTHROMBIN TIME: CPT

## 2021-04-08 PROCEDURE — 85025 COMPLETE CBC W/AUTO DIFF WBC: CPT

## 2021-04-08 PROCEDURE — 36415 COLL VENOUS BLD VENIPUNCTURE: CPT

## 2021-04-08 PROCEDURE — 80048 BASIC METABOLIC PNL TOTAL CA: CPT

## 2021-04-08 PROCEDURE — 49083 ABD PARACENTESIS W/IMAGING: CPT

## 2021-04-08 PROCEDURE — P9046 ALBUMIN (HUMAN), 25%, 20 ML: HCPCS

## 2021-04-08 PROCEDURE — 87426 SARSCOV CORONAVIRUS AG IA: CPT

## 2021-04-08 PROCEDURE — U0003 INFECTIOUS AGENT DETECTION BY NUCLEIC ACID (DNA OR RNA); SEVERE ACUTE RESPIRATORY SYNDROME CORONAVIRUS 2 (SARS-COV-2) (CORONAVIRUS DISEASE [COVID-19]), AMPLIFIED PROBE TECHNIQUE, MAKING USE OF HIGH THROUGHPUT TECHNOLOGIES AS DESCRIBED BY CMS-2020-01-R: HCPCS

## 2021-04-08 NOTE — NUR
Discharge Note:



ELISE CERON



Discharge instructions and discharge home medications reviewed with Patient and a copy 
given. All questions have been answered and understanding verbalized. 



The following instructions and handouts were given: Paracentesis



Discontinued lines and drains: Right FA IV dc'd and tip intact.



Patient discharged to home with brother via personal vehicle.

## 2021-04-09 NOTE — RAD
Ultrasound-guided paracentesis   4/9/2021 6:37 AM

  

Procedure: The risks and benefits of the procedure were discussed the patient.

 Informed consent was obtained. A timeout procedure was performed. Sonographic

evaluation of the abdomen was performed demonstrating ascites . The right

lower quadrant was prepped and draped using maximum sterile barrier technique.

 1% lidocaine without epinephrine was administered for local anesthesia.

Real-time ultrasonographic guidance was used in passing a 5 Danish Yueh

catheter into the fluid collection.  7 L of serous ascites was removed. The

catheter was removed and pressure held to achieve hemostasis. A sterile

dressing was applied.





Impression: Successful ultrasound-guided paracentesis

## 2021-04-22 ENCOUNTER — HOSPITAL ENCOUNTER (OUTPATIENT)
Dept: HOSPITAL 61 - ONCLAB | Age: 76
End: 2021-04-22
Attending: PHYSICIAN ASSISTANT
Payer: MEDICARE

## 2021-04-22 DIAGNOSIS — D63.1: Primary | ICD-10-CM

## 2021-04-22 LAB
% LYMPHS: 32 % (ref 24–48)
% MONOS: 10 % (ref 0–10)
% SEGS: 50 % (ref 35–66)
ALBUMIN SERPL-MCNC: 3 G/DL (ref 3.4–5)
ALBUMIN/GLOB SERPL: 0.8 {RATIO} (ref 1–1.7)
ALP SERPL-CCNC: 113 U/L (ref 46–116)
ALT SERPL-CCNC: 14 U/L (ref 14–59)
ANION GAP SERPL CALC-SCNC: 4 MMOL/L (ref 6–14)
ANISOCYTOSIS BLD QL SMEAR: SLIGHT
AST SERPL-CCNC: 20 U/L (ref 15–37)
BASOPHILS # BLD AUTO: 0 X10^3/UL (ref 0–0.2)
BASOPHILS NFR BLD AUTO: 2 % (ref 0–3)
BASOPHILS NFR BLD: 1 % (ref 0–3)
BILIRUB SERPL-MCNC: 0.9 MG/DL (ref 0.2–1)
BUN SERPL-MCNC: 31 MG/DL (ref 7–20)
BUN/CREAT SERPL: 9 (ref 6–20)
CALCIUM SERPL-MCNC: 8.9 MG/DL (ref 8.5–10.1)
CHLORIDE SERPL-SCNC: 103 MMOL/L (ref 98–107)
CO2 SERPL-SCNC: 32 MMOL/L (ref 21–32)
CREAT SERPL-MCNC: 3.4 MG/DL (ref 0.6–1)
EOSINOPHIL NFR BLD AUTO: 6 % (ref 0–5)
EOSINOPHIL NFR BLD: 0.2 X10^3/UL (ref 0–0.7)
EOSINOPHIL NFR BLD: 6 % (ref 0–3)
ERYTHROCYTE [DISTWIDTH] IN BLOOD BY AUTOMATED COUNT: 17.6 % (ref 11.5–14.5)
GFR SERPLBLD BASED ON 1.73 SQ M-ARVRAT: 13.2 ML/MIN
GLUCOSE SERPL-MCNC: 140 MG/DL (ref 70–99)
HCT VFR BLD CALC: 28.9 % (ref 36–47)
HGB BLD-MCNC: 9.7 G/DL (ref 12–15.5)
LDH SERPL L TO P-CCNC: 220 U/L (ref 81–234)
LYMPHOCYTES # BLD: 1.1 X10^3/UL (ref 1–4.8)
LYMPHOCYTES NFR BLD AUTO: 31 % (ref 24–48)
MCH RBC QN AUTO: 34 PG (ref 25–35)
MCHC RBC AUTO-ENTMCNC: 34 G/DL (ref 31–37)
MCV RBC AUTO: 102 FL (ref 79–100)
MONO #: 0.4 X10^3/UL (ref 0–1.1)
MONOCYTES NFR BLD: 11 % (ref 0–9)
NEUT #: 1.8 X10^3/UL (ref 1.8–7.7)
NEUTROPHILS NFR BLD AUTO: 52 % (ref 31–73)
OVALOCYTES BLD QL SMEAR: (no result)
PLATELET # BLD AUTO: 115 X10^3/UL (ref 140–400)
PLATELET # BLD EST: (no result) 10*3/UL
POTASSIUM SERPL-SCNC: 4 MMOL/L (ref 3.5–5.1)
PROT SERPL-MCNC: 6.9 G/DL (ref 6.4–8.2)
RBC # BLD AUTO: 2.83 X10^6/UL (ref 3.5–5.4)
SCHISTOCYTES BLD QL SMEAR: (no result)
SODIUM SERPL-SCNC: 139 MMOL/L (ref 136–145)
WBC # BLD AUTO: 3.5 X10^3/UL (ref 4–11)

## 2021-04-22 PROCEDURE — 85007 BL SMEAR W/DIFF WBC COUNT: CPT

## 2021-04-22 PROCEDURE — 80053 COMPREHEN METABOLIC PANEL: CPT

## 2021-04-22 PROCEDURE — 82105 ALPHA-FETOPROTEIN SERUM: CPT

## 2021-04-22 PROCEDURE — 85025 COMPLETE CBC W/AUTO DIFF WBC: CPT

## 2021-04-22 PROCEDURE — 36415 COLL VENOUS BLD VENIPUNCTURE: CPT

## 2021-04-22 PROCEDURE — 83615 LACTATE (LD) (LDH) ENZYME: CPT

## 2021-05-04 ENCOUNTER — HOSPITAL ENCOUNTER (OUTPATIENT)
Dept: HOSPITAL 61 - INTRAD | Age: 76
Discharge: HOME | End: 2021-05-04
Attending: INTERNAL MEDICINE
Payer: MEDICARE

## 2021-05-04 VITALS — DIASTOLIC BLOOD PRESSURE: 58 MMHG | SYSTOLIC BLOOD PRESSURE: 105 MMHG

## 2021-05-04 VITALS — BODY MASS INDEX: 24.11 KG/M2 | WEIGHT: 150 LBS | HEIGHT: 66 IN

## 2021-05-04 VITALS — DIASTOLIC BLOOD PRESSURE: 58 MMHG | SYSTOLIC BLOOD PRESSURE: 112 MMHG

## 2021-05-04 VITALS — SYSTOLIC BLOOD PRESSURE: 100 MMHG | DIASTOLIC BLOOD PRESSURE: 59 MMHG

## 2021-05-04 VITALS — SYSTOLIC BLOOD PRESSURE: 117 MMHG | DIASTOLIC BLOOD PRESSURE: 60 MMHG

## 2021-05-04 VITALS — DIASTOLIC BLOOD PRESSURE: 68 MMHG | SYSTOLIC BLOOD PRESSURE: 110 MMHG

## 2021-05-04 VITALS — DIASTOLIC BLOOD PRESSURE: 62 MMHG | SYSTOLIC BLOOD PRESSURE: 107 MMHG

## 2021-05-04 DIAGNOSIS — K70.31: Primary | ICD-10-CM

## 2021-05-04 DIAGNOSIS — M19.90: ICD-10-CM

## 2021-05-04 DIAGNOSIS — I12.9: ICD-10-CM

## 2021-05-04 DIAGNOSIS — Z88.8: ICD-10-CM

## 2021-05-04 DIAGNOSIS — N18.4: ICD-10-CM

## 2021-05-04 DIAGNOSIS — Z72.89: ICD-10-CM

## 2021-05-04 DIAGNOSIS — Z88.2: ICD-10-CM

## 2021-05-04 DIAGNOSIS — E11.22: ICD-10-CM

## 2021-05-04 DIAGNOSIS — Z79.899: ICD-10-CM

## 2021-05-04 DIAGNOSIS — Z87.891: ICD-10-CM

## 2021-05-04 DIAGNOSIS — K21.9: ICD-10-CM

## 2021-05-04 DIAGNOSIS — Z79.82: ICD-10-CM

## 2021-05-04 DIAGNOSIS — Z98.890: ICD-10-CM

## 2021-05-04 DIAGNOSIS — Z90.49: ICD-10-CM

## 2021-05-04 DIAGNOSIS — Z99.2: ICD-10-CM

## 2021-05-04 DIAGNOSIS — M10.9: ICD-10-CM

## 2021-05-04 PROCEDURE — P9046 ALBUMIN (HUMAN), 25%, 20 ML: HCPCS

## 2021-05-04 PROCEDURE — 49083 ABD PARACENTESIS W/IMAGING: CPT

## 2021-05-04 NOTE — RAD
Ultrasound-guided paracentesis   5/4/2021 11:40 AM

  

Procedure: The risks and benefits of the procedure were discussed the patient.

 Informed consent was obtained. A timeout procedure was performed. Sonographic

evaluation of the abdomen was performed demonstrating ascites . The right

lower quadrant was prepped and draped using maximum sterile barrier technique.

 1% lidocaine without epinephrine was administered for local anesthesia.

Real-time ultrasonographic guidance was used in passing a 5 Wolof Yueh

catheter into the fluid collection.  3.9 L of serous ascites was removed. The

catheter was removed and pressure held to achieve hemostasis. A sterile

dressing was applied.





Impression: Successful ultrasound-guided paracentesis

## 2021-05-17 ENCOUNTER — HOSPITAL ENCOUNTER (EMERGENCY)
Dept: HOSPITAL 61 - ER | Age: 76
LOS: 1 days | Discharge: HOME | End: 2021-05-18
Payer: MEDICARE

## 2021-05-17 VITALS — BODY MASS INDEX: 24.16 KG/M2 | HEIGHT: 66 IN | WEIGHT: 150.36 LBS

## 2021-05-17 DIAGNOSIS — Z90.710: ICD-10-CM

## 2021-05-17 DIAGNOSIS — Z87.891: ICD-10-CM

## 2021-05-17 DIAGNOSIS — Z88.2: ICD-10-CM

## 2021-05-17 DIAGNOSIS — N18.9: ICD-10-CM

## 2021-05-17 DIAGNOSIS — Z90.49: ICD-10-CM

## 2021-05-17 DIAGNOSIS — Z88.8: ICD-10-CM

## 2021-05-17 DIAGNOSIS — E11.22: ICD-10-CM

## 2021-05-17 DIAGNOSIS — K59.00: Primary | ICD-10-CM

## 2021-05-17 PROCEDURE — 74022 RADEX COMPL AQT ABD SERIES: CPT

## 2021-05-18 VITALS
SYSTOLIC BLOOD PRESSURE: 115 MMHG | DIASTOLIC BLOOD PRESSURE: 60 MMHG | DIASTOLIC BLOOD PRESSURE: 60 MMHG | DIASTOLIC BLOOD PRESSURE: 60 MMHG | SYSTOLIC BLOOD PRESSURE: 115 MMHG | DIASTOLIC BLOOD PRESSURE: 60 MMHG | DIASTOLIC BLOOD PRESSURE: 60 MMHG | SYSTOLIC BLOOD PRESSURE: 115 MMHG | SYSTOLIC BLOOD PRESSURE: 115 MMHG | SYSTOLIC BLOOD PRESSURE: 115 MMHG | DIASTOLIC BLOOD PRESSURE: 60 MMHG | SYSTOLIC BLOOD PRESSURE: 115 MMHG

## 2021-05-18 NOTE — PHYS DOC
Past Medical History


Past Medical History:  Diabetes-Type II, Renal Failure, Other


Additional Past Medical Histor:  Cirrhosis of liver


Past Surgical History:  Cholecystectomy, Hysterectomy


Smoking Status:  Former Smoker


Alcohol Use:  None





General Adult


EDM:


Chief Complaint:  CONTISPATION





HPI:


HPI:





Patient is a 75  year old female presents with the chief complaint of 

constipation.


States last BM on wednesday.


States tried to have BM tonight and fells a





Review of Systems:


Review of Systems:


Constitutional:   Denies fever or chills. []


Eyes:   Denies change in visual acuity. []


HENT:   Denies nasal congestion or sore throat. [] 


Respiratory:   Denies cough or shortness of breath. [] 


Cardiovascular:   Denies chest pain or edema. [] 


GI:   Denies abdominal pain, nausea, vomiting, bloody stools or diarrhea. [] 


:  Denies dysuria. [] 


Musculoskeletal:   Denies back pain or joint pain. [] 


Integument:   Denies rash. [] 


Neurologic:   Denies headache, focal weakness or sensory changes. [] 


Endocrine:   Denies polyuria or polydipsia. [] 


Lymphatic:  Denies swollen glands. [] 


Psychiatric:  Denies depression or anxiety. []





Heart Score:


C/O Chest Pain:  N/A


Risk Factors:


Risk Factors:  DM, Current or recent (<one month) smoker, HTN, HLP, family 

history of CAD, obesity.


Risk Scores:


Score 0 - 3:  2.5% MACE over next 6 weeks - Discharge Home


Score 4 - 6:  20.3% MACE over next 6 weeks - Admit for Clinical Observation


Score 7 - 10:  72.7% MACE over next 6 weeks - Early Invasive Strategies





Allergies:


Allergies:





Allergies








Coded Allergies Type Severity Reaction Last Updated Verified


 


  Sulfa (Sulfonamide Antibiotics) Allergy Intermediate Hives 2/9/18 No


 


  ibuprofen Allergy Intermediate Hives 2/9/18 No











Physical Exam:


PE:





Constitutional: Well developed, well nourished, no acute distress, non-toxic 

appearance. []


HENT: Normocephalic, atraumatic, bilateral external ears normal, oropharynx 

moist, no oral exudates, nose normal. []


Eyes: PERRLA, EOMI, conjunctiva normal, no discharge. [] 


Neck: Normal range of motion, no tenderness, supple, no stridor. [] 


Cardiovascular:Heart rate regular rhythm, no murmur []


Lungs & Thorax:  Bilateral breath sounds clear to auscultation []


Abdomen: Bowel sounds normal, soft, no tenderness, no masses, no pulsatile 

masses. [] 


Skin: Warm, dry, no erythema, no rash. [] 


Back: No tenderness, no CVA tenderness. [] 


Extremities: No tenderness, no cyanosis, no clubbing, ROM intact, no edema. [] 


Neurologic: Alert and oriented X 3, normal motor function, normal sensory 

function, no focal deficits noted. []


Psychologic: Affect normal, judgement normal, mood normal. []





Current Patient Data:


Vital Signs:





                                   Vital Signs








  Date Time  Temp Pulse Resp B/P (MAP) Pulse Ox O2 Delivery O2 Flow Rate FiO2


 


5/17/21 23:45 98.0 89 18 122/59 (80) 100 Room Air  





 98.0       











EKG:


EKG:


[]





Radiology/Procedures:


Radiology/Procedures:


[]


Impression:


IMPRESSION: 





3 views of chest and abdomen obtained. Calcific atherosclerosis with cardiac 

silhouette otherwise unremarkable. There is some air seen within the soft 

tissues overlying the left shoulder and axillary region. Right-sided vascular 

catheter with tip at the superior vena cava. No focal airspace consolidation. 

Catheter within the pelvis. Degenerative changes of the spine and hips. Stool is

 seen throughout the colon with a grossly nonobstructive bowel gas pattern.





Course & Med Decision Making:


Course & Med Decision Making


Pertinent Labs and Imaging studies reviewed. (See chart for details)





[] Was evaluated for chief complaint.  Work-up consisted of radiologic imaging. 

 Results reviewed and discussed with patient.  Chest x-ray and no bowel 

obstruction stool without the colon.


Treatment included enema with no relief.  Patient then underwent digital disc 

impaction by myself.  Patient tolerated procedure large amount of stool removed 

from the rectum.





Patient was discharged home with GoLYTELY.





Dragon Disclaimer:


Dragon Disclaimer:


This electronic medical record was generated, in whole or in part, using a voice

 recognition dictation system.





Departure


Departure


Impression:  


   Primary Impression:  


   Constipation


Disposition:  01 HOME / SELF CARE / HOMELESS


Condition:  STABLE


Referrals:  


RONDA DELVALLE (PCP)


Patient Instructions:  Constipation, Adult


Scripts


Peg 3350/Na Sulf,Bicarb,Cl/Kcl (GOLYTELY SOLUTION) 4,000 Ml Soln.recon


4000 ML PO 1X for 1 Day, #1 MISC


   8-10 oz q 10-15 minutes until stool


   Prov: ELIZABETH WEEMS DO         5/18/21











ELIZABETH WEMES DO            May 18, 2021 00:29

## 2021-07-20 ENCOUNTER — HOSPITAL ENCOUNTER (OUTPATIENT)
Dept: HOSPITAL 63 - RAD | Age: 76
End: 2021-07-20
Attending: NURSE PRACTITIONER
Payer: MEDICARE

## 2021-07-20 DIAGNOSIS — M19.041: ICD-10-CM
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
## 2021-07-20 DIAGNOSIS — S69.91XA: ICD-10-CM

## 2021-07-20 DIAGNOSIS — S62.616A: Primary | ICD-10-CM
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
## 2021-07-20 DIAGNOSIS — M79.89: ICD-10-CM

## 2021-07-20 PROCEDURE — 73130 X-RAY EXAM OF HAND: CPT

## 2021-07-20 NOTE — RAD
EXAM:  XR HAND_RIGHT 3 VIEWS 7/20/2021 1:45 PM



CLINICAL INDICATION:  Right hand: Outstretched hand



COMPARISON:  None



TECHNIQUE:  3 views of the right



FINDINGS:  The bones are diffusely demineralized. There is an angulated acute fracture of the little 
finger proximal phalanx. There is severe joint space narrowing throughout the interphalangeal joints 
with articular erosions and gull-wing deformity. Severe erosions at the thumb MCP joint with pencil i
n cup appearance. There is degenerative joint disease with large ossified at the first CMC joint. The
re is periarticular soft tissue swelling and diffuse soft tissue swelling of the finger.



IMPRESSION:  

1. Acute fracture of the little finger proximal phalanx.

2.  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
is or psoriatic arthritis. 

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



Electronically signed by: Amanda Contreras MD (7/20/2021 2:08 PM) [id number]

## 2021-07-27 ENCOUNTER — HOSPITAL ENCOUNTER (OUTPATIENT)
Dept: HOSPITAL 63 - RAD | Age: 76
End: 2021-07-27
Attending: PHYSICIAN ASSISTANT
Payer: MEDICARE

## 2021-07-27 DIAGNOSIS — K59.00: Primary | ICD-10-CM

## 2021-07-27 PROCEDURE — 74019 RADEX ABDOMEN 2 VIEWS: CPT

## 2021-07-27 NOTE — RAD
EXAM: ABDOMEN 2 VIEWS.



HISTORY: Constipation.



COMPARISON: None.



FINDINGS: Supine and upright views of the abdomen are obtained. A peritoneal dialysis catheter loops 
within the pelvis.



There is no pneumoperitoneum. There are no distended small bowel loops or significant air fluid level
s. There is gas distally.



IMPRESSION:

1. No evidence of obstruction. The amount of colonic stool is not clearly abnormally increased.



Electronically signed by: SERENITY Ness MD (7/27/2021 1:55 PM) [facility]

## 2021-09-10 ENCOUNTER — HOSPITAL ENCOUNTER (OUTPATIENT)
Dept: HOSPITAL 61 - US | Age: 76
End: 2021-09-10
Attending: INTERNAL MEDICINE
Payer: MEDICARE

## 2021-09-10 DIAGNOSIS — R79.89: ICD-10-CM

## 2021-09-10 DIAGNOSIS — K74.5: ICD-10-CM

## 2021-09-10 DIAGNOSIS — R18.8: ICD-10-CM

## 2021-09-10 DIAGNOSIS — R16.1: Primary | ICD-10-CM

## 2021-09-10 DIAGNOSIS — Z90.49: ICD-10-CM

## 2021-09-10 PROCEDURE — 76700 US EXAM ABDOM COMPLETE: CPT

## 2021-09-10 PROCEDURE — 93975 VASCULAR STUDY: CPT

## 2021-09-10 NOTE — RAD
9/10/2021 3:15 PM



Complete abdominal ultrasound 

Duplex and hepatic ultrasound



Clinical History: Reason: BILIARY CIRRHOSIS/ELEVATED LFT'S / Spl. Instructions:  / History: 



Technique:  Ultrasound examination of the abdomen was performed, and multiple static images were subm
itted for review. Duplex pedicle ultrasound was also performed including color Doppler imaging spectr
al analysis.



Comparison:  None 



Findings:



Visualized portions of the pancreas are prominent. Pancreas is poorly visualized.



Visualized aorta and IVC are unremarkable.



The liver is normal in size. Hepatic echotexture is coarsened. No focal hepatic lesions are identifie
d. Liver contour appears mildly nodular. The gallbladder is surgically absent. Spleen is enlarged nilson
suring 14 cm longitudinally.



Portal venous flow appears to be in the normal direction within normal velocity. Portal vein velocity
 is 26 cm a second. Right left portal vein appear to be grossly patent. Hepatic veins appear grossly 
patent. Splenic vein appears to be grossly patent. Trace ascites around the liver noted.

kidney measures 9.2 x 3.9 x 4.1 cm. Renal cortical thinning is present. 



Left kidney is atrophic in appearance measuring 9.0 x 3.6 x 4.8 cm.





IMPRESSION:



1. Nodular contour of the liver with hepatic coarsening. Trace perihepatic ascites.



2. Splenomegaly



3. Normal direction of velocity of the portal vein



4. Mild prominence of the pancreas. Consider CT for further characterization as clinically indicated.




Electronically signed by: Eliud Cool MD (9/10/2021 4:46 PM) [id number]

## 2021-09-10 NOTE — RAD
9/10/2021 3:15 PM



Complete abdominal ultrasound 

Duplex and hepatic ultrasound



Clinical History: Reason: BILIARY CIRRHOSIS/ELEVATED LFT'S / Spl. Instructions:  / History: 



Technique:  Ultrasound examination of the abdomen was performed, and multiple static images were subm
itted for review. Duplex pedicle ultrasound was also performed including color Doppler imaging spectr
al analysis.



Comparison:  None 



Findings:



Visualized portions of the pancreas are prominent. Pancreas is poorly visualized.



Visualized aorta and IVC are unremarkable.



The liver is normal in size. Hepatic echotexture is coarsened. No focal hepatic lesions are identifie
d. Liver contour appears mildly nodular. The gallbladder is surgically absent. Spleen is enlarged nilson
suring 14 cm longitudinally.



Portal venous flow appears to be in the normal direction within normal velocity. Portal vein velocity
 is 26 cm a second. Right left portal vein appear to be grossly patent. Hepatic veins appear grossly 
patent. Splenic vein appears to be grossly patent. Trace ascites around the liver noted.

kidney measures 9.2 x 3.9 x 4.1 cm. Renal cortical thinning is present. 



Left kidney is atrophic in appearance measuring 9.0 x 3.6 x 4.8 cm.





IMPRESSION:



1. Nodular contour of the liver with hepatic coarsening. Trace perihepatic ascites.



2. Splenomegaly



3. Normal direction of velocity of the portal vein



4. Mild prominence of the pancreas. Consider CT for further characterization as clinically indicated.




Electronically signed by: Eliud Cool MD (9/10/2021 4:46 PM) [id number]

## 2021-10-01 ENCOUNTER — HOSPITAL ENCOUNTER (OUTPATIENT)
Dept: HOSPITAL 61 - ECHO | Age: 76
End: 2021-10-01
Attending: INTERNAL MEDICINE
Payer: MEDICARE

## 2021-10-01 DIAGNOSIS — I95.9: ICD-10-CM

## 2021-10-01 DIAGNOSIS — R01.1: ICD-10-CM

## 2021-10-01 DIAGNOSIS — I07.1: Primary | ICD-10-CM

## 2021-10-01 PROCEDURE — 93306 TTE W/DOPPLER COMPLETE: CPT

## 2021-10-01 NOTE — CARD
MR#: [id number]

Account#: [id number]

Accession#: [id number]

Date of Study: 10/01/2021

Ordering Physician: YVONNE BYRNE, 

Referring Physician: YVONNE BYRNE, 

Tech: Yaneth Maldonado [facility]





--------------- APPROVED REPORT --------------





EXAM: Two-dimensional and M-mode echocardiogram with Doppler and color Doppler.



Other Information 

Quality : AverageHR: 82bpm

Rhythm : NSR



INDICATION

Murmur 



2D DIMENSIONS 

RVDd3.0 (2.9-3.5cm)Left Atrium(2D)3.4 (1.6-4.0cm)

IVSd1.0 (0.7-1.1cm)Aortic Root(2D)2.9 (2.0-3.7cm)

LVDd4.0 (3.9-5.9cm)LVOT Diameter2.1 (1.8-2.4cm)

PWd1.0 (0.7-1.1cm)LVDs2.3 (2.5-4.0cm)

FS (%) 42.8 %SV52.1 ml



Aortic Valve

AoV Peak Gab.164.3cm/sAoV VTI37.3cm

AO Peak GR.10.8mmHgLVOT Peak Gab.117.8cm/s

AO Mean GR.5mmHgAVA (VMAX)2.48cm2



Pulmonary Valve

PV Peak Pycskbzi027.4cm/s



Tricuspid Valve

TR P. Igldmzsz433zm/sTR Peak Gr.32mmHg



 LEFT VENTRICLE 

The left ventricle is normal size. There is normal left ventricular wall thickness. The left ventricu
lar systolic function is normal. LV ejection fraction is 55 to 60%. There is normal LV segmental wall
 motion. Transmitral Doppler flow pattern is Grade I-abnormal relaxation pattern.



 RIGHT VENTRICLE 

The right ventricle is normal size. There is normal right ventricular wall thickness. The right ventr
icular systolic function is normal.



 ATRIA 

The left atrium size is normal. The right atrium size is normal.



 AORTIC VALVE 

The aortic valve is normal in structure and function. Doppler and Color Flow revealed trace aortic re
gurgitation. There is no significant aortic valvular stenosis.



 MITRAL VALVE 

The mitral valve is normal in structure and function. There is no evidence of mitral valve prolapse. 
There is no mitral valve stenosis. Doppler and Color-flow revealed trace mitral regurgitation.



 TRICUSPID VALVE 

The tricuspid valve is normal in structure and function. Doppler and Color Flow revealed mild tricusp
id regurgitation. Esitmated PAP 30 mmHg. There is no tricuspid valve stenosis.



 PULMONIC VALVE 

The pulmonary valve is normal in structure and function. Doppler and Color Flow revealed no pulmonic 
valvular regurgitation.



 GREAT VESSELS 

The aortic root is normal in size. The ascending aorta is normal in size. The IVC is normal in size a
nd collapses >50% with inspiration.



 PERICARDIAL EFFUSION 

There is no evidence of significant pericardial effusion.



Critical Notification

Critical Value: No



<Conclusion>

The left ventricle is normal size.

The left ventricular systolic function is normal.

LV ejection fraction is 55 to 60%.

Doppler and Color Flow revealed trace aortic regurgitation.

There is no significant aortic valvular stenosis.

Doppler and Color-flow revealed trace mitral regurgitation.

Doppler and Color Flow revealed mild tricuspid regurgitation.  Esitmated PAP 30 mmHg.



Signed by : Michael Spring MD

Electronically Approved : 10/01/2021 16:42:53

## 2021-10-26 ENCOUNTER — HOSPITAL ENCOUNTER (OUTPATIENT)
Dept: HOSPITAL 61 - ONCLAB | Age: 76
End: 2021-10-26
Attending: PHYSICIAN ASSISTANT
Payer: MEDICARE

## 2021-10-26 DIAGNOSIS — D63.1: ICD-10-CM

## 2021-10-26 DIAGNOSIS — N18.9: Primary | ICD-10-CM

## 2021-10-26 LAB
ALBUMIN SERPL-MCNC: 2.2 G/DL (ref 3.4–5)
ALBUMIN/GLOB SERPL: 0.6 {RATIO} (ref 1–1.7)
ALP SERPL-CCNC: 123 U/L (ref 46–116)
ALT SERPL-CCNC: 51 U/L (ref 14–59)
ANION GAP SERPL CALC-SCNC: 6 MMOL/L (ref 6–14)
AST SERPL-CCNC: 27 U/L (ref 15–37)
BASOPHILS # BLD AUTO: 0 X10^3/UL (ref 0–0.2)
BASOPHILS NFR BLD: 1 % (ref 0–3)
BILIRUB SERPL-MCNC: 0.6 MG/DL (ref 0.2–1)
BUN SERPL-MCNC: 92 MG/DL (ref 7–20)
BUN/CREAT SERPL: 11 (ref 6–20)
CALCIUM SERPL-MCNC: 8.7 MG/DL (ref 8.5–10.1)
CHLORIDE SERPL-SCNC: 95 MMOL/L (ref 98–107)
CO2 SERPL-SCNC: 32 MMOL/L (ref 21–32)
CREAT SERPL-MCNC: 8.7 MG/DL (ref 0.6–1)
EOSINOPHIL NFR BLD: 0.2 X10^3/UL (ref 0–0.7)
EOSINOPHIL NFR BLD: 3 % (ref 0–3)
ERYTHROCYTE [DISTWIDTH] IN BLOOD BY AUTOMATED COUNT: 18.3 % (ref 11.5–14.5)
GFR SERPLBLD BASED ON 1.73 SQ M-ARVRAT: 4.5 ML/MIN
GLUCOSE SERPL-MCNC: 158 MG/DL (ref 70–99)
HCT VFR BLD CALC: 30.2 % (ref 36–47)
HGB BLD-MCNC: 9.7 G/DL (ref 12–15.5)
LYMPHOCYTES # BLD: 0.8 X10^3/UL (ref 1–4.8)
LYMPHOCYTES NFR BLD AUTO: 15 % (ref 24–48)
MCH RBC QN AUTO: 35 PG (ref 25–35)
MCHC RBC AUTO-ENTMCNC: 32 G/DL (ref 31–37)
MCV RBC AUTO: 109 FL (ref 79–100)
MONO #: 0.6 X10^3/UL (ref 0–1.1)
MONOCYTES NFR BLD: 11 % (ref 0–9)
NEUT #: 3.6 X10^3/UL (ref 1.8–7.7)
NEUTROPHILS NFR BLD AUTO: 70 % (ref 31–73)
PLATELET # BLD AUTO: 121 X10^3/UL (ref 140–400)
POTASSIUM SERPL-SCNC: 5.5 MMOL/L (ref 3.5–5.1)
PROT SERPL-MCNC: 6 G/DL (ref 6.4–8.2)
RBC # BLD AUTO: 2.76 X10^6/UL (ref 3.5–5.4)
SODIUM SERPL-SCNC: 133 MMOL/L (ref 136–145)
WBC # BLD AUTO: 5.1 X10^3/UL (ref 4–11)

## 2021-10-26 PROCEDURE — 36415 COLL VENOUS BLD VENIPUNCTURE: CPT

## 2021-10-26 PROCEDURE — 80053 COMPREHEN METABOLIC PANEL: CPT

## 2021-10-26 PROCEDURE — 85025 COMPLETE CBC W/AUTO DIFF WBC: CPT

## 2022-02-25 ENCOUNTER — HOSPITAL ENCOUNTER (EMERGENCY)
Dept: HOSPITAL 63 - ER | Age: 77
Discharge: HOME | End: 2022-02-25
Payer: MEDICARE

## 2022-02-25 VITALS — HEIGHT: 65 IN | WEIGHT: 190.26 LBS | BODY MASS INDEX: 31.7 KG/M2

## 2022-02-25 VITALS — SYSTOLIC BLOOD PRESSURE: 108 MMHG | DIASTOLIC BLOOD PRESSURE: 60 MMHG

## 2022-02-25 DIAGNOSIS — D53.9: Primary | ICD-10-CM

## 2022-02-25 DIAGNOSIS — Z99.2: ICD-10-CM

## 2022-02-25 DIAGNOSIS — Z88.2: ICD-10-CM

## 2022-02-25 DIAGNOSIS — Z88.6: ICD-10-CM

## 2022-02-25 DIAGNOSIS — N18.6: ICD-10-CM

## 2022-02-25 LAB
ALBUMIN SERPL-MCNC: 2.2 G/DL (ref 3.4–5)
ALBUMIN/GLOB SERPL: 0.7 {RATIO} (ref 1–1.7)
ALP SERPL-CCNC: 106 U/L (ref 46–116)
ALT SERPL-CCNC: 27 U/L (ref 14–59)
ANION GAP SERPL CALC-SCNC: 7 MMOL/L (ref 6–14)
AST SERPL-CCNC: 24 U/L (ref 15–37)
BASOPHILS # BLD AUTO: 0 X10^3/UL (ref 0–0.2)
BASOPHILS NFR BLD: 1 % (ref 0–3)
BILIRUB SERPL-MCNC: 0.7 MG/DL (ref 0.2–1)
BUN/CREAT SERPL: 4 (ref 6–20)
CA-I SERPL ISE-MCNC: 13 MG/DL (ref 7–20)
CALCIUM SERPL-MCNC: 8 MG/DL (ref 8.5–10.1)
CHLORIDE SERPL-SCNC: 97 MMOL/L (ref 98–107)
CO2 SERPL-SCNC: 32 MMOL/L (ref 21–32)
CREAT SERPL-MCNC: 3.1 MG/DL (ref 0.6–1)
EOSINOPHIL NFR BLD: 0.1 X10^3/UL (ref 0–0.7)
EOSINOPHIL NFR BLD: 5 % (ref 0–3)
ERYTHROCYTE [DISTWIDTH] IN BLOOD BY AUTOMATED COUNT: 18.7 % (ref 11.5–14.5)
GFR SERPLBLD BASED ON 1.73 SQ M-ARVRAT: 14.7 ML/MIN
GLOBULIN SER-MCNC: 3.3 G/DL (ref 2.2–3.8)
GLUCOSE SERPL-MCNC: 91 MG/DL (ref 70–99)
HCT VFR BLD CALC: 24.7 % (ref 36–47)
HGB BLD-MCNC: 8.3 G/DL (ref 12–15.5)
LYMPHOCYTES # BLD: 0.8 X10^3/UL (ref 1–4.8)
LYMPHOCYTES NFR BLD AUTO: 27 % (ref 24–48)
MCH RBC QN AUTO: 36 PG (ref 25–35)
MCHC RBC AUTO-ENTMCNC: 34 G/DL (ref 31–37)
MCV RBC AUTO: 107 FL (ref 79–100)
MONO #: 0.4 X10^3/UL (ref 0–1.1)
MONOCYTES NFR BLD: 12 % (ref 0–9)
NEUT #: 1.6 X10^3UL (ref 1.8–7.7)
NEUTROPHILS NFR BLD AUTO: 55 % (ref 31–73)
PLATELET # BLD AUTO: 92 X10^3/UL (ref 140–400)
POTASSIUM SERPL-SCNC: 3.1 MMOL/L (ref 3.5–5.1)
PROT SERPL-MCNC: 5.5 G/DL (ref 6.4–8.2)
RBC # BLD AUTO: 2.31 X10^6/UL (ref 3.5–5.4)
SODIUM SERPL-SCNC: 136 MMOL/L (ref 136–145)
WBC # BLD AUTO: 2.9 X10^3/UL (ref 4–11)

## 2022-02-25 PROCEDURE — 86901 BLOOD TYPING SEROLOGIC RH(D): CPT

## 2022-02-25 PROCEDURE — 36415 COLL VENOUS BLD VENIPUNCTURE: CPT

## 2022-02-25 PROCEDURE — 85025 COMPLETE CBC W/AUTO DIFF WBC: CPT

## 2022-02-25 PROCEDURE — 86850 RBC ANTIBODY SCREEN: CPT

## 2022-02-25 PROCEDURE — 86900 BLOOD TYPING SEROLOGIC ABO: CPT

## 2022-02-25 PROCEDURE — 85730 THROMBOPLASTIN TIME PARTIAL: CPT

## 2022-02-25 PROCEDURE — 85610 PROTHROMBIN TIME: CPT

## 2022-02-25 PROCEDURE — 80053 COMPREHEN METABOLIC PANEL: CPT

## 2022-02-25 PROCEDURE — 99283 EMERGENCY DEPT VISIT LOW MDM: CPT

## 2022-02-25 NOTE — PHYS DOC
Past History


Past Surgical History:  Cholecystectomy, Hysterectomy, Other


Additional Past Surgical Histo:  shunt in left arm


Alcohol Use:  None





General Adult


EDM:


Chief Complaint:  ABNORMAL LABS





HPI:


HPI:


76-year-old female past medical history of ESRD (MWF, last HD today) presents 

the ED with concern for low hemoglobin (Hg 7) requiring blood transfusion.  

Patient reports she was called last night by Dr. Ghosh and told to come to 

emergency department.  Patient completed her dialysis today.  Patient with no 

associated exertional dyspnea, shortness of breath, melena, hematemesis, 

epistaxis, vaginal bleeding, hematuria, hematochezia or recent blood loss.  

Patient with no active complaints and reports she feels well.





Review of Systems:


Review of Systems:


Constitutional:  Denies fever or chills 


Eyes:  Denies change in visual acuity 


HENT:  Denies nasal congestion or sore throat 


Respiratory:  Denies cough or shortness of breath 


Cardiovascular:  Denies chest pain or edema 


GI:  Denies bloody stools or diarrhea 


: Denies hematuria vaginal bleeding


Musculoskeletal:  Denies back pain or joint pain 


Integument:  Denies rash or diaphoresis


Neurologic:  Denies headache, focal weakness or sensory changes 


Endocrine:  Denies polyuria or polydipsia 


Lymphatic:  Denies swollen glands 


Psychiatric:  Denies depression or anxiety





Allergies:


Allergies:





Allergies








Coded Allergies Type Severity Reaction Last Updated Verified


 


  Sulfa (Sulfonamide Antibiotics) Allergy Intermediate  4/22/20 Yes


 


  ibuprofen Allergy Unknown  4/22/20 Yes











Physical Exam:


PE:





Constitutional: Well developed, well nourished, no acute distress, non-toxic 

appearance. 


HENT: Normocephalic, atraumatic,


Eyes: EOMI, conjunctiva normal, no discharge.  


Neck: Normal range of motion,  supple, 


Cardiovascular: S1/2 present, regular rhythm


Lungs & Thorax: Speaking in full sentences, bilateral equal chest rise, no 

tachypnea or increased work of breathing


Abdomen:  soft, no tenderness, 


Skin: Warm, dry, no erythema, no rash. [] 


Back: No tenderness, no CVA tenderness. [] 


Extremities: No tenderness, no cyanosis, thrill left arm, equal bilateral lower 

extremity swelling


Neurologic: Alert and oriented X 3, normal motor function, normal sensory 

function, no focal deficits noted. []


Psychologic: Affect normal, judgement normal, mood normal. []





Current Patient Data:


Labs:





                                Laboratory Tests








Test


 2/25/22


15:50


 


White Blood Count


 2.9 x10^3/uL


(4.0-11.0)  L


 


Red Blood Count


 2.31 x10^6/uL


(3.50-5.40)  L


 


Hemoglobin


 8.3 g/dL


(12.0-15.5)  L


 


Hematocrit


 24.7 %


(36.0-47.0)  L


 


Mean Corpuscular Volume


 107 fL


()  H


 


Mean Corpuscular Hemoglobin


 36 pg (25-35)


H


 


Mean Corpuscular Hemoglobin


Concent 34 g/dL


(31-37)


 


Red Cell Distribution Width


 18.7 %


(11.5-14.5)  H


 


Platelet Count


 92 x10^3/uL


(140-400)  L


 


Neutrophils (%) (Auto) 55 % (31-73)  


 


Lymphocytes (%) (Auto) 27 % (24-48)  


 


Monocytes (%) (Auto) 12 % (0-9)  H


 


Eosinophils (%) (Auto) 5 % (0-3)  H


 


Basophils (%) (Auto) 1 % (0-3)  


 


Neutrophils # (Auto)


 1.6 x10^3uL


(1.8-7.7)  L


 


Lymphocytes # (Auto)


 0.8 x10^3/uL


(1.0-4.8)  L


 


Monocytes # (Auto)


 0.4 x10^3/uL


(0.0-1.1)


 


Eosinophils # (Auto)


 0.1 x10^3/uL


(0.0-0.7)


 


Basophils # (Auto)


 0.0 x10^3/uL


(0.0-0.2)


 


Prothrombin Time


 10.3 SEC


(9.4-11.4)


 


Prothrombin Time INR 1.0 (0.9-1.1)  


 


Activated Partial


Thromboplast Time 21 SEC (23-33)


L


 


Sodium Level


 136 mmol/L


(136-145)


 


Potassium Level


 3.1 mmol/L


(3.5-5.1)  L


 


Chloride Level


 97 mmol/L


()  L


 


Carbon Dioxide Level


 32 mmol/L


(21-32)


 


Anion Gap 7 (6-14)  


 


Blood Urea Nitrogen


 13 mg/dL


(7-20)


 


Creatinine


 3.1 mg/dL


(0.6-1.0)  H


 


Estimated GFR


(Cockcroft-Gault) 14.7  





 


BUN/Creatinine Ratio 4 (6-20)  L


 


Glucose Level


 91 mg/dL


(70-99)


 


Calcium Level


 8.0 mg/dL


(8.5-10.1)  L


 


Total Bilirubin


 0.7 mg/dL


(0.2-1.0)


 


Aspartate Amino Transferase


(AST) 24 U/L (15-37)





 


Alanine Aminotransferase (ALT)


 27 U/L (14-59)





 


Alkaline Phosphatase


 106 U/L


()


 


Total Protein


 5.5 g/dL


(6.4-8.2)  L


 


Albumin


 2.2 g/dL


(3.4-5.0)  L


 


Albumin/Globulin Ratio


 0.7 (1.0-1.7)


L








Vital Signs:





                                   Vital Signs








  Date Time  Temp Pulse Resp B/P (MAP) Pulse Ox O2 Delivery O2 Flow Rate FiO2


 


2/25/22 15:39 98.2 89 16 106/53 (70) 100 Room Air  











EKG:


EKG:


[]





Radiology/Procedures:


Radiology/Procedures:


[]





Heart Score:


C/O Chest Pain:  No


Risk Factors:


Risk Factors:  DM, Current or recent (<one month) smoker, HTN, HLP, family 

history of CAD, obesity.


Risk Scores:


Score 0 - 3:  2.5% MACE over next 6 weeks - Discharge Home


Score 4 - 6:  20.3% MACE over next 6 weeks - Admit for Clinical Observation


Score 7 - 10:  72.7% MACE over next 6 weeks - Early Invasive Strategies





Course & Med Decision Making:


Course & Med Decision Making


Pertinent Labs and Imaging studies reviewed. (See chart for details)





Encounter for macrocytic, asymptomatic anemia.  Hemoglobin today improved from 

yesterday.  No indication for blood transfusion at this time.  Patient well-

appearing, hemodynamically stable.  Will discharge home with strict ED return 

precautions were given for fever, blood loss or exertional dyspnea. Encouraged 

urgent outpatient follow-up with PMD for routine care and hematology to consider

 macrocytic anemia evaluation.  Life-threatening processes were considered but 

are low suspicion at this time, given history, physical exam and ED workup. Pt 

was educated on all prescription medications and adverse effects.  All patient's

 questions were answered and pt was stable at time of discharge.





Life/limb-threatening differential includes but is not limited to, end organ 

damage/sepsis, trauma/abuse/neglect, neurologic deficit, alcohol/drug ingestion,

 toxidrome, suicidal/homicidal ideations plans or attempts, psychosis or mental 

illness resulting in self neglect and inability to care for self.





I have spoken with the patient and/or caregivers.  I explained the patient's 

condition, diagnoses and treatment plan based on the information available to me

 at this time.  I have answered the patient and/or caregiver's questions and 

addressed any concerns.  The patient and/or caregivers have a good understanding

 of patient's diagnosis, condition and treatment plan as can be expected at this

 point.  Vital signs have been stable.  Patient's condition is stable and 

appropriate for discharge from the emergency department. 





Patient will pursue further outpatient evaluation with primary care physician or

 other designated or consulting physician as outlined in the discharge 

instructions.  The patient and/or caregivers are agreeable to this plan of care 

and follow-up instructions have been explained in detail.  The patient and/or 

caregivers have received these instructions in written form and have expressed 

an understanding of the discharge instructions.  The patient and/or caregivers 

are aware that any significant change of condition or worsening of symptoms vinny

uld prompt immediate return to this or the closest emergency department or call 

to [id number] Disclaimer:


Dragon Disclaimer:


This electronic medical record was generated, in whole or in part, using a voice

 recognition dictation system.





Departure


Departure:


Impression:  


   Primary Impression:  


   Macrocytic anemia


Disposition:  01 HOME / SELF CARE / HOMELESS


Condition:  STABLE


Referrals:  


RONDA DELVALLE (PCP)


Follow-up within 1 to 2 weeks for reevaluation


Hg/Hct 8.3/24.7


Patient Instructions:  Anemia, FAQs





Additional Instructions:  


FOLLOW UP WITH:  FOR DEFINITIVE MANAGEMENT of macrocytic anemia


Hematology/Oncology


[facility]


[address] [place] [id number]


Phone: [phone]





EMERGENCY DEPARTMENT GENERAL DISCHARGE INSTRUCTIONS





Thank you for coming to [facility] (ED) today and trusting us

 with you 


care.  We trust that you had a positivie experience in our Emergency Department.

  If you 


wish to speak to the department management, you may call the director at 

[phone].





YOUR FOLLOW UP INSTRUCTIONS ARE AS FOLLOWS:





1.  Do you have a private Doctor?  If you do not have a private doctor, please 

ask for a 


resource list of physicians or clinics that may be able to assist you with 

follow up care.





2.  The Emergency Physician has interpreted your x-rays.  The X-Ray specialist 

will also 


review them.  If there is a change in the findings, you will be notified in 48 

hours when at 


all possible.





3.  A lab test or culture has been done, your results will be reviewed and you 

will be 


notified if you need a change in treatment.





ADDITIONAL INSTRUCTIONS AND INFORMATION:





1.  Your care today has been supervised by a physician who is specially trained 

in emergency 


care.  Many problems require more than one evaluation for a complete diagnosis 

and 


treatment.  We recommend that you schedule your follow up appointment as 

recommended to 


ensure complete treatment of you illness or injury.  If you are unable to obtain

 follow up 


care and continue to have a problem, or if your condition worsens, we recommend 

that you 


return to the ED.





2.  We are not able to safely determine your condition over the phone nor are we

 able to 


give sound medical advice over the phone.  For these safety reasons, if you call

 for medical 


advice we will ask you to come to the ED for further evaluation.





3.  If you have any questions regarding these discharge instructions please call

 the ED at 


[phone].





SAFETY INFORMATION:





In the interest of safety, wellness, and injury prevention; we encourage you to 

wear your 


sealbelt, if you smoke; quite smoking, and we encourage family to use a 

protective helmet 


for bicycling and other sporting events that present an increased risk for head 

injury.





IF YOUR SYMPTOMS WORSEN OR NEW SYMPTOMS DEVELOP, OR YOU HAVE CONCERNS ABOUT YOUR

 CONDITION; 


OR IF YOUR CONDITION WORSENS WHILE YOU ARE WAITING FOR YOUR FOLLOW UP 

APPOINTMENT; EITHER 


CONTACT YOUR PRIMARY CARE DOCTOR, THE PHYSICIAN WHOSE NAME AND NUMBER YOU WERE 

GIVEN, OR 


RETURN TO THE ED IMMEDIATELY.











RONDA ARREOLA DO               Feb 25, 2022 17:36

## 2022-04-26 ENCOUNTER — HOSPITAL ENCOUNTER (OUTPATIENT)
Dept: HOSPITAL 61 - ONCLAB | Age: 77
End: 2022-04-26
Attending: INTERNAL MEDICINE
Payer: MEDICARE

## 2022-04-26 DIAGNOSIS — N18.9: Primary | ICD-10-CM

## 2022-04-26 DIAGNOSIS — D63.1: ICD-10-CM

## 2022-04-26 LAB
ALBUMIN SERPL-MCNC: 2.2 G/DL (ref 3.4–5)
ALBUMIN/GLOB SERPL: 0.6 {RATIO} (ref 1–1.7)
ALP SERPL-CCNC: 148 U/L (ref 46–116)
ALT SERPL-CCNC: 42 U/L (ref 14–59)
ANION GAP SERPL CALC-SCNC: 9 MMOL/L (ref 6–14)
AST SERPL-CCNC: 19 U/L (ref 15–37)
BASOPHILS # BLD AUTO: 0 X10^3/UL (ref 0–0.2)
BASOPHILS NFR BLD: 1 % (ref 0–3)
BILIRUB SERPL-MCNC: 0.5 MG/DL (ref 0.2–1)
BUN SERPL-MCNC: 87 MG/DL (ref 7–20)
BUN/CREAT SERPL: 9 (ref 6–20)
CALCIUM SERPL-MCNC: 8.8 MG/DL (ref 8.5–10.1)
CHLORIDE SERPL-SCNC: 91 MMOL/L (ref 98–107)
CO2 SERPL-SCNC: 29 MMOL/L (ref 21–32)
CREAT SERPL-MCNC: 9.7 MG/DL (ref 0.6–1)
EOSINOPHIL NFR BLD: 0.5 X10^3/UL (ref 0–0.7)
EOSINOPHIL NFR BLD: 10 % (ref 0–3)
ERYTHROCYTE [DISTWIDTH] IN BLOOD BY AUTOMATED COUNT: 17.4 % (ref 11.5–14.5)
GFR SERPLBLD BASED ON 1.73 SQ M-ARVRAT: 3.9 ML/MIN
GLUCOSE SERPL-MCNC: 90 MG/DL (ref 70–99)
HCT VFR BLD CALC: 25.4 % (ref 36–47)
HGB BLD-MCNC: 8.6 G/DL (ref 12–15.5)
LYMPHOCYTES # BLD: 1 X10^3/UL (ref 1–4.8)
LYMPHOCYTES NFR BLD AUTO: 22 % (ref 24–48)
MCH RBC QN AUTO: 36 PG (ref 25–35)
MCHC RBC AUTO-ENTMCNC: 34 G/DL (ref 31–37)
MCV RBC AUTO: 106 FL (ref 79–100)
MONO #: 0.5 X10^3/UL (ref 0–1.1)
MONOCYTES NFR BLD: 12 % (ref 0–9)
NEUT #: 2.5 X10^3/UL (ref 1.8–7.7)
NEUTROPHILS NFR BLD AUTO: 56 % (ref 31–73)
PLATELET # BLD AUTO: 114 X10^3/UL (ref 140–400)
POTASSIUM SERPL-SCNC: 4.9 MMOL/L (ref 3.5–5.1)
PROT SERPL-MCNC: 6.1 G/DL (ref 6.4–8.2)
RBC # BLD AUTO: 2.41 X10^6/UL (ref 3.5–5.4)
SODIUM SERPL-SCNC: 129 MMOL/L (ref 136–145)
WBC # BLD AUTO: 4.6 X10^3/UL (ref 4–11)

## 2022-04-26 PROCEDURE — 83540 ASSAY OF IRON: CPT

## 2022-04-26 PROCEDURE — 82607 VITAMIN B-12: CPT

## 2022-04-26 PROCEDURE — 82728 ASSAY OF FERRITIN: CPT

## 2022-04-26 PROCEDURE — 80053 COMPREHEN METABOLIC PANEL: CPT

## 2022-04-26 PROCEDURE — 83921 ORGANIC ACID SINGLE QUANT: CPT

## 2022-04-26 PROCEDURE — 85025 COMPLETE CBC W/AUTO DIFF WBC: CPT

## 2022-04-26 PROCEDURE — 82746 ASSAY OF FOLIC ACID SERUM: CPT

## 2022-04-26 PROCEDURE — 36415 COLL VENOUS BLD VENIPUNCTURE: CPT

## 2022-04-26 PROCEDURE — 83550 IRON BINDING TEST: CPT

## 2022-05-24 ENCOUNTER — HOSPITAL ENCOUNTER (OUTPATIENT)
Dept: HOSPITAL 61 - ONCLAB | Age: 77
End: 2022-05-24
Attending: INTERNAL MEDICINE
Payer: MEDICARE

## 2022-05-24 DIAGNOSIS — D63.1: ICD-10-CM

## 2022-05-24 DIAGNOSIS — D50.8: Primary | ICD-10-CM

## 2022-05-24 LAB
ALBUMIN SERPL-MCNC: 2.5 G/DL (ref 3.4–5)
ALBUMIN/GLOB SERPL: 0.8 {RATIO} (ref 1–1.7)
ALP SERPL-CCNC: 163 U/L (ref 46–116)
ALT SERPL-CCNC: 56 U/L (ref 14–59)
ANION GAP SERPL CALC-SCNC: 14 MMOL/L (ref 6–14)
AST SERPL-CCNC: 24 U/L (ref 15–37)
BASOPHILS # BLD AUTO: 0 X10^3/UL (ref 0–0.2)
BASOPHILS NFR BLD: 0 % (ref 0–3)
BILIRUB SERPL-MCNC: 0.6 MG/DL (ref 0.2–1)
BUN SERPL-MCNC: 75 MG/DL (ref 7–20)
BUN/CREAT SERPL: 8 (ref 6–20)
CALCIUM SERPL-MCNC: 8.4 MG/DL (ref 8.5–10.1)
CHLORIDE SERPL-SCNC: 92 MMOL/L (ref 98–107)
CO2 SERPL-SCNC: 27 MMOL/L (ref 21–32)
CREAT SERPL-MCNC: 9.5 MG/DL (ref 0.6–1)
EOSINOPHIL NFR BLD: 0.3 X10^3/UL (ref 0–0.7)
EOSINOPHIL NFR BLD: 7 % (ref 0–3)
ERYTHROCYTE [DISTWIDTH] IN BLOOD BY AUTOMATED COUNT: 16.8 % (ref 11.5–14.5)
GFR SERPLBLD BASED ON 1.73 SQ M-ARVRAT: 4 ML/MIN
GLUCOSE SERPL-MCNC: 172 MG/DL (ref 70–99)
HCT VFR BLD CALC: 25 % (ref 36–47)
HGB BLD-MCNC: 8.5 G/DL (ref 12–15.5)
LYMPHOCYTES # BLD: 0.9 X10^3/UL (ref 1–4.8)
LYMPHOCYTES NFR BLD AUTO: 18 % (ref 24–48)
MCH RBC QN AUTO: 35 PG (ref 25–35)
MCHC RBC AUTO-ENTMCNC: 34 G/DL (ref 31–37)
MCV RBC AUTO: 104 FL (ref 79–100)
MONO #: 0.6 X10^3/UL (ref 0–1.1)
MONOCYTES NFR BLD: 13 % (ref 0–9)
NEUT #: 3 X10^3/UL (ref 1.8–7.7)
NEUTROPHILS NFR BLD AUTO: 62 % (ref 31–73)
PLATELET # BLD AUTO: 93 X10^3/UL (ref 140–400)
POTASSIUM SERPL-SCNC: 3.6 MMOL/L (ref 3.5–5.1)
PROT SERPL-MCNC: 5.6 G/DL (ref 6.4–8.2)
RBC # BLD AUTO: 2.41 X10^6/UL (ref 3.5–5.4)
SODIUM SERPL-SCNC: 133 MMOL/L (ref 136–145)
WBC # BLD AUTO: 4.9 X10^3/UL (ref 4–11)

## 2022-05-24 PROCEDURE — 36415 COLL VENOUS BLD VENIPUNCTURE: CPT

## 2022-05-24 PROCEDURE — 85025 COMPLETE CBC W/AUTO DIFF WBC: CPT

## 2022-05-24 PROCEDURE — 80053 COMPREHEN METABOLIC PANEL: CPT
